# Patient Record
Sex: FEMALE | Race: WHITE | NOT HISPANIC OR LATINO | Employment: FULL TIME | URBAN - METROPOLITAN AREA
[De-identification: names, ages, dates, MRNs, and addresses within clinical notes are randomized per-mention and may not be internally consistent; named-entity substitution may affect disease eponyms.]

---

## 2017-01-04 ENCOUNTER — HOSPITAL ENCOUNTER (EMERGENCY)
Facility: HOSPITAL | Age: 37
Discharge: HOME | End: 2017-01-04
Attending: EMERGENCY MEDICINE
Payer: MEDICAID

## 2017-01-04 VITALS
HEART RATE: 56 BPM | TEMPERATURE: 98 F | HEIGHT: 64 IN | DIASTOLIC BLOOD PRESSURE: 62 MMHG | SYSTOLIC BLOOD PRESSURE: 107 MMHG | RESPIRATION RATE: 16 BRPM | WEIGHT: 140 LBS | OXYGEN SATURATION: 99 % | BODY MASS INDEX: 23.9 KG/M2

## 2017-01-04 DIAGNOSIS — M25.512 ACUTE PAIN OF LEFT SHOULDER: ICD-10-CM

## 2017-01-04 DIAGNOSIS — M79.622 LEFT UPPER ARM PAIN: ICD-10-CM

## 2017-01-04 DIAGNOSIS — S49.92XA SHOULDER INJURY, LEFT, INITIAL ENCOUNTER: Primary | ICD-10-CM

## 2017-01-04 DIAGNOSIS — L03.012 PARONYCHIA OF FINGER, LEFT: ICD-10-CM

## 2017-01-04 PROCEDURE — 96372 THER/PROPH/DIAG INJ SC/IM: CPT

## 2017-01-04 PROCEDURE — 63600175 PHARM REV CODE 636 W HCPCS: Performed by: NURSE PRACTITIONER

## 2017-01-04 PROCEDURE — 64450 NJX AA&/STRD OTHER PN/BRANCH: CPT

## 2017-01-04 PROCEDURE — 99283 EMERGENCY DEPT VISIT LOW MDM: CPT | Mod: 25

## 2017-01-04 PROCEDURE — 25000003 PHARM REV CODE 250: Performed by: NURSE PRACTITIONER

## 2017-01-04 RX ORDER — METHOCARBAMOL 500 MG/1
1000 TABLET, FILM COATED ORAL 3 TIMES DAILY
Qty: 30 TABLET | Refills: 0 | Status: SHIPPED | OUTPATIENT
Start: 2017-01-04 | End: 2017-01-09

## 2017-01-04 RX ORDER — SULFAMETHOXAZOLE AND TRIMETHOPRIM 800; 160 MG/1; MG/1
1 TABLET ORAL 2 TIMES DAILY
Qty: 14 TABLET | Refills: 0 | Status: SHIPPED | OUTPATIENT
Start: 2017-01-04 | End: 2017-01-11

## 2017-01-04 RX ORDER — HYDROMORPHONE HYDROCHLORIDE 2 MG/ML
0.5 INJECTION, SOLUTION INTRAMUSCULAR; INTRAVENOUS; SUBCUTANEOUS
Status: COMPLETED | OUTPATIENT
Start: 2017-01-04 | End: 2017-01-04

## 2017-01-04 RX ORDER — LIDOCAINE HYDROCHLORIDE 10 MG/ML
10 INJECTION INFILTRATION; PERINEURAL
Status: COMPLETED | OUTPATIENT
Start: 2017-01-04 | End: 2017-01-04

## 2017-01-04 RX ORDER — ACETAMINOPHEN 325 MG/1
325 TABLET ORAL EVERY 6 HOURS PRN
COMMUNITY

## 2017-01-04 RX ORDER — NAPROXEN 500 MG/1
500 TABLET ORAL 2 TIMES DAILY WITH MEALS
Qty: 10 TABLET | Refills: 0 | Status: SHIPPED | OUTPATIENT
Start: 2017-01-04 | End: 2017-01-09

## 2017-01-04 RX ADMIN — LIDOCAINE HYDROCHLORIDE 10 ML: 10 INJECTION, SOLUTION INFILTRATION; PERINEURAL at 12:01

## 2017-01-04 RX ADMIN — HYDROMORPHONE HYDROCHLORIDE 0.5 MG: 2 INJECTION, SOLUTION INTRAMUSCULAR; INTRAVENOUS; SUBCUTANEOUS at 12:01

## 2017-01-04 NOTE — ED PROVIDER NOTES
Encounter Date: 2017    SCRIBE #1 NOTE: I, CarrolVaughnFátima Booth, am scribing for, and in the presence of,  Addi Mendes NP. I have scribed the following portions of the note - Other sections scribed: HPI, ROS.       History     Chief Complaint   Patient presents with    Shoulder Pain     left shoulder since yesterday after playing with her daughter who pulled hard on her left arm     Review of patient's allergies indicates:  No Known Allergies  HPI Comments: CC: Shoulder Pain    37 y/o female with anxiety, bulging disc, depression, and chronic cystitis presents to the ED c/o acute onset L sided shoulder pain that radiates to back side of upper L arm. Pain is severe (10/10), exacerbates with movement, and have progressively worsened. Pt reports that she was playing with her daughter yesterday, who accidentally pulled pt's L arm outwardly. Pt attempted Tylenol and ibuprofen with no relief. Pt reports that she has been under a lot of stress recently. Pt denies fever, cough, weakness/numbness to fingers, or elbow pain. No alleviating factors or other symptoms reported.    The history is provided by the patient. No  was used.     Past Medical History   Diagnosis Date    Anxiety     Bulging disc     Chronic cystitis     Depression      No past medical history pertinent negatives.  Past Surgical History   Procedure Laterality Date    Hysterectomy       section, classic       x2    Tonsillectomy      Sinus surgery       Family History   Problem Relation Age of Onset    Hypertension Mother      Social History   Substance Use Topics    Smoking status: Never Smoker    Smokeless tobacco: Never Used    Alcohol use No     Review of Systems   Constitutional: Negative for chills and fever.   HENT: Negative for rhinorrhea.    Eyes: Negative for redness.   Respiratory: Negative for cough and shortness of breath.    Cardiovascular: Negative for chest pain.   Gastrointestinal: Negative for  abdominal pain, diarrhea, nausea and vomiting.   Genitourinary: Negative for difficulty urinating and dysuria.   Musculoskeletal:        (+) L sided shoulder pain that radiates to back side of upper L arm   (-) numbness/weakness to fingers  (-) elbow pain   Skin: Negative for rash.   Neurological: Negative for headaches.       Physical Exam   Initial Vitals   BP Pulse Resp Temp SpO2   01/04/17 1108 01/04/17 1108 01/04/17 1108 01/04/17 1108 01/04/17 1108   125/85 111 20 98 °F (36.7 °C) 99 %     Physical Exam    Nursing note and vitals reviewed.  Constitutional: She appears well-developed and well-nourished. She is not diaphoretic. She is cooperative.  Non-toxic appearance. No distress.   HENT:   Head: Normocephalic and atraumatic.   Right Ear: External ear normal.   Left Ear: External ear normal.   Eyes: Conjunctivae and EOM are normal.   Neck: Normal range of motion. No tracheal deviation and normal range of motion present. No rigidity.   Cardiovascular: Normal rate, regular rhythm, normal heart sounds and intact distal pulses. Exam reveals no gallop and no friction rub.    No murmur heard.  Pulses:       Radial pulses are 2+ on the right side, and 2+ on the left side.   Pulmonary/Chest: Effort normal and breath sounds normal. No stridor. No tachypnea and no bradypnea. No respiratory distress. She has no wheezes. She has no rhonchi. She has no rales. She exhibits no tenderness.   Abdominal: Soft. Bowel sounds are normal. She exhibits no distension and no mass. There is no tenderness.   Musculoskeletal:        Left shoulder: She exhibits decreased range of motion, tenderness and pain. She exhibits no swelling, no effusion, no deformity, no laceration and no spasm.        Left elbow: Normal. No tenderness found.        Left upper arm: She exhibits tenderness.        Arms:       Left hand: She exhibits normal capillary refill. Decreased sensation is not present in the ulnar distribution, is not present in the medial  redistribution and is not present in the radial distribution. Motor /Testing: The patient has normal left wrist strength. Wrist Extension: 5/5. Wrist Flexion: 5/5. : 5/5. Index Finger: 5/5. Middle Finger: 5/5. Ring Finger: 5/5. Little Finger: 5/5. Thumb APB: 5/5. Thumb FPL: 5/5. Pinch Mechanism: 5/5.        Hands:  Mild tender to palpation over the posterior left shoulder.  No starting erythema, edema, or bruising noted.  No crepitus with range of motion.  Decreased range of motion secondary to pain however equal strength bilaterally.   Neurological: She is alert and oriented to person, place, and time. She has normal strength. No sensory deficit. GCS eye subscore is 4. GCS verbal subscore is 5. GCS motor subscore is 6.   Skin: Skin is warm, dry and intact. No rash noted. No cyanosis or erythema. Nails show no clubbing.   Psychiatric: Her behavior is normal. Thought content normal. Her mood appears anxious.         ED Course   I & D - Incision and Drainage  Date/Time: 1/4/2017 1:30 PM  Performed by: MEL BUNCH  Authorized by: ONEAL BERNARDO JR   Type: abscess  Body area: upper extremity  Location details: left index finger  Anesthesia: digital block    Anesthesia:  Anesthesia: digital block  Local Anesthetic: lidocaine 1% without epinephrine   Anesthetic total: 1 mL  Patient sedated: no  Scalpel size: 11  Complications: No  Specimens: No  Implants: No  Comments: Attempted to I&D the wound.  After partial attempt at digital block the patient refused further injections he finished the block or attempts at I&D.        Labs Reviewed - No data to display       X-Rays:   Independently Interpreted Readings:   Other Readings:  X-ray of the left shoulder reviewed by me reveals no evidence of fracture or dislocation.    Medical Decision Making:   Clinical Tests:   Radiological Study: Ordered and Reviewed       APC / Resident Notes:   This is an evaluation of a 36-year-old female that presents emergency  department complaints of left shoulder and upper arm pain since last night.  She also reports redness and swelling to the radial lateral nail fold of the second digit. The patient is a non-toxic, afebrile, anxious, but well appearing female. On physical exam, there is tender to palpation of the posterior left shoulder and the proximal upper left muscular humerus.  No palpable deformity or crepitus noted on exam.  No surrounding erythema, edema, or bruising.  She does have full range of motion with the left shoulder however she does report pain with movement of the shoulder.  Unable to perform testing behind back secondary to pain area.  Radial pulse +2 and equal bilaterally equal sensation.  No bony tenderness over the distal humerus or the elbow.  She also asked me to look at an area of redness along the nail on her left finger.  She reports she was seen at an outside ER and given antibiotics for the infection on her finger however she did not fill them due to cost.  She reports it is not improving or worsening at this point.  There is erythema with minimal fluctuance to the ulnar lateral nail fold of the second digit.  It is no tenderness over the DIP or PIP joints.  No surrounding erythema past lateral nail fold.  No area of increased warmth.  No wound drainage at this time.  Cap refill normal. Vital Signs Are Reassuring. RESULTS: X-ray negative for fracture dislocation of the shoulder.  Procedures: Attempted to I&D the wound to the finger however after partially numbing the digit the patient reported she does not want to go forward with the I&D.  Will cover with oral antibiotics and wound care instructions.    Given the above findings, my overall impression is shoulder pain, shoulder injury, paronychia. Given the above findings, I do not think the patient has fracture, dislocation, septic joint, significant cellulitis requiring inpatient admission.    In the ED the patient was treated with: IM Dilaudid.  After  the medication, the patient reports her shoulder pain is improved, she is ranging it more, and appears less anxious.  The patient will be discharged home with Robaxin, naproxen, and Bactrim. Additional recommendations: Wound care instructions. The diagnosis, treatment plan, instructions for follow-up and reevaluation with her PCP for finger and orthopedics for shoulder as well as ED return precautions have been discussed with the patient and she has verbalized an understanding of the information.  The patient and significant other were informed that there were no bony abnormalities.  This does not completely exclude ligament injuries of the shoulder and instructions for follow-up.  All questions or concerns from the patient have been addressed. This case was discussed with Dr. Carr who is in agreement with my assessment and plan.  MAG Gutierrez, COLT-KONSTANTIN       Scribe Attestation:   Scribe #1: I performed the above scribed service and the documentation accurately describes the services I performed. I attest to the accuracy of the note.    Attending Attestation:     Physician Attestation Statement for NP/PA:   I discussed this assessment and plan of this patient with the NP/PA, but I did not personally examine the patient. The face to face encounter was performed by the NP/PA.    Other NP/PA Attestation Additions:    History of Present Illness: 36-year-old female presents the emergency department with left shoulder injury.   Physical Exam: Complete physical examination performed in detail by midlevel provider and discussed with me.   Medical Decision Making: This is the emergent evaluation of a 36-year-old female presents the emergency department with left shoulder pain after injury.I agree with the treatment plan and evaluation as outlined by the midlevel provider.  X-ray clear fracture or dislocation.  We'll advised him to medical treatment.  Advise follow-up with PCP as needed and return for new or worsening  symptoms.  Likely muscle strain.  Cannot completely rule out tenderness or ligamentous injury.  Patient advised to return for new or worsening symptoms such as weakness of the upper extremity or persistent paresthesias.       Physician Attestation for Scribe:  Physician Attestation Statement for Scribe #1: I, Addi Friedman - NP, reviewed documentation, as scribed by Jerrica Booth in my presence, and it is both accurate and complete.                 ED Course     Clinical Impression:   The primary encounter diagnosis was Shoulder injury, left, initial encounter. Diagnoses of Acute pain of left shoulder, Left upper arm pain, and Paronychia of finger, left were also pertinent to this visit.    Disposition:   Disposition: Discharged  Condition: Stable       COLT Chaudhry  01/04/17 134       Sammy Ortiz Jr., MD  01/04/17 5298

## 2017-01-04 NOTE — ED AVS SNAPSHOT
[facility]  [address] [place] [address]               Serene Loaiza   2017 11:31 AM   ED    Description:  Female : 1980   Department:  [facility]           Your Care was Coordinated By:     Provider Role From To    Sammy Ortiz Jr., MD Attending Provider 17 1133 --    COLT Chaudhry Nurse Practitioner 17 1130 --      Reason for Visit     Shoulder Pain           Diagnoses this Visit        Comments    Shoulder injury, left, initial encounter    -  Primary     Acute pain of left shoulder         Left upper arm pain         Paronychia of finger, left           ED Disposition     ED Disposition Condition Comment    Discharge             To Do List           Follow-up Information     Follow up with Vicky Mcneill NP.    Specialty:  Family Medicine    Why:  This Week, For Follow-Up of your finger    Contact information:    [address]  [place] [address]  [phone]          Schedule an appointment as soon as possible for a visit with Jd Grewal MD.    Specialty:  Orthopedic Surgery    Why:  For Follow-Up of your shoulder    Contact information:    [address] [place] [address]  [phone]          Go to [facility].    Specialty:  Emergency Medicine    Why:  If symptoms worsen    Contact information:    [address] [place] [address] [phone]       These Medications        Disp Refills Start End    naproxen (NAPROSYN) 500 MG tablet 10 tablet 0 2017    Take 1 tablet (500 mg total) by mouth 2 (two) times daily with meals. - Oral    Pharmacy: [facility]/pharmacy #[id number] [place] [address] [place] - [address] Ph #: [phone]       sulfamethoxazole-trimethoprim 800-160mg (BACTRIM DS) 800-160 mg Tab 14 tablet 0 2017    Take 1 tablet by mouth 2 (two) times daily. - Oral    Pharmacy: [facility]/pharmacy #[id number]  [place] - [address]  Ph #: [phone]       methocarbamol (ROBAXIN) 500 MG Tab 30 tablet 0 1/4/2017 1/9/2017    Take 2 tablets (1,000 mg total) by mouth 3 (three) times daily. - Oral    Pharmacy: [facility]/pharmacy #[id number] - [place] [address] Ph #: [phone]         [facility]     [facility] On [facility] Care Line - 24/7 Assistance  Registered nurses in the [facility] provide clinical advisement, health education, appointment booking, and other advisory services.  Call for this free service at [phone].             Medications           Message regarding Medications     Verify the changes and/or additions to your medication regime listed below are the same as discussed with your clinician today.  If any of these changes or additions are incorrect, please notify your healthcare provider.        START taking these NEW medications        Refills    naproxen (NAPROSYN) 500 MG tablet 0    Sig: Take 1 tablet (500 mg total) by mouth 2 (two) times daily with meals.    Class: Print    Route: Oral    sulfamethoxazole-trimethoprim 800-160mg (BACTRIM DS) 800-160 mg Tab 0    Sig: Take 1 tablet by mouth 2 (two) times daily.    Class: Print    Route: Oral    methocarbamol (ROBAXIN) 500 MG Tab 0    Sig: Take 2 tablets (1,000 mg total) by mouth 3 (three) times daily.    Class: Print    Route: Oral      These medications were administered today        Dose Freq    lidocaine HCL 10 mg/ml (1%) injection 10 mL 10 mL ED 1 Time    Sig: 10 mLs by Infiltration route ED 1 Time.    Class: Normal    Route: Infiltration    hydromorphone (PF) injection 0.5 mg 0.5 mg ED 1 Time    Sig: Inject 0.25 mLs (0.5 mg total) into the muscle ED 1 Time.    Class: Normal    Route: Intramuscular      STOP taking these medications     docusate sodium (COLACE) 50 MG capsule Take by mouth 2 (two) times daily.    tramadol (ULTRAM) 50 mg tablet Take 1 tablet (50 mg total) by mouth every 6 (six) hours as needed for Pain.    promethazine (PHENERGAN) 25 MG tablet  "Take 1 tablet (25 mg total) by mouth every 4 (four) hours.    gabapentin (NEURONTIN) 300 MG capsule TAKE ONE CAPSULE EVERY EVENING    cyclobenzaprine (FLEXERIL) 5 MG tablet Take 1 tablet (5 mg total) by mouth 3 (three) times daily as needed for Muscle spasms.    alprazolam (XANAX) 0.25 MG tablet TAKE 1 TABLET BY MOUTH TWICE DAILY AS NEEDED           Verify that the below list of medications is an accurate representation of the medications you are currently taking.  If none reported, the list may be blank. If incorrect, please contact your healthcare provider. Carry this list with you in case of emergency.           Current Medications     acetaminophen (TYLENOL) 325 MG tablet Take 325 mg by mouth every 6 (six) hours as needed for Pain.    methocarbamol (ROBAXIN) 500 MG Tab Take 2 tablets (1,000 mg total) by mouth 3 (three) times daily.    naproxen (NAPROSYN) 500 MG tablet Take 1 tablet (500 mg total) by mouth 2 (two) times daily with meals.    sulfamethoxazole-trimethoprim 800-160mg (BACTRIM DS) 800-160 mg Tab Take 1 tablet by mouth 2 (two) times daily.           Clinical Reference Information           Your Vitals Were     BP Pulse Temp Resp Height Weight    125/85 (BP Location: Right arm, Patient Position: Sitting) 111 98 °F (36.7 °C) (Oral) 20 5' 4" (1.626 m) 63.5 kg (140 lb)    Last Period SpO2 BMI          09/09/2013 99% 24.03 kg/m2        Allergies as of 1/4/2017     No Known Allergies      Immunizations Administered on Date of Encounter - 1/4/2017     None      ED Micro, Lab, POCT     None      ED Imaging Orders     Start Ordered       Status Ordering Provider    01/04/17 1114 01/04/17 1113  X-Ray Shoulder 2 or More Views Left  1 time imaging      Final result         Discharge Instructions       Please return to the Emergency Department for any new or worsening symptoms including: worsening shoulder pain, changes in your current pain, fever, chest pain, shortness of breath, loss of consciousness, dizziness, " weakness, or any other concerns. Please follow up with your Primary Care Provider within in the week for reevaluation of your left finger.  Please follow up with orthopedics for further evaluation of your shoulder pain.  Please do not use the sling more than 1-2 days to help with pain.    Please take all medication as prescribed.       Discharge References/Attachments     PARONYCHIA (ENGLISH)    R.I.C.E. (ENGLISH)    SHOULDER PAIN, UNCERTAIN CAUSE (ENGLISH)       [facility] complies with applicable Federal civil rights laws and does not discriminate on the basis of race, color, national origin, age, disability, or sex.        Language Assistance Services     ATTENTION: Language assistance services are available, free of charge. Please call [phone].      ATENCIÓN: Si nicolasala angelita, tiene a ovalle disposición servicios gratuitos de asistencia lingüística. Llame al [phone].     CHÚ Ý: N?u b?n nói Ti?ng Vi?t, có các d?ch v? h? tr? ngôn ng? mi?n phí dành cho b?n. G?i s? [phone].

## 2017-01-04 NOTE — DISCHARGE INSTRUCTIONS
Please return to the Emergency Department for any new or worsening symptoms including: worsening shoulder pain, changes in your current pain, fever, chest pain, shortness of breath, loss of consciousness, dizziness, weakness, or any other concerns. Please follow up with your Primary Care Provider within in the week for reevaluation of your left finger.  Please follow up with orthopedics for further evaluation of your shoulder pain.  Please do not use the sling more than 1-2 days to help with pain.    Please take all medication as prescribed.

## 2020-01-10 ENCOUNTER — TELEPHONE (OUTPATIENT)
Dept: TRANSPLANT | Facility: CLINIC | Age: 40
End: 2020-01-10

## 2020-01-10 NOTE — TELEPHONE ENCOUNTER
----- Message from Fanta Hylton sent at 1/10/2020  3:40 PM CST -----  Contact: Self    Returned call to pt x2. Called pt 3rd time, but she said that she will call back;she was at work    ----- Message -----  From: Olivia Ruff MA  Sent: 1/10/2020   3:37 PM CST  To: [facility]    This is not an EP. They need to go to the referral team first  ----- Message -----  From: Jesica Salinas  Sent: 1/6/2020   2:23 PM CST  To: [facility] Hepat Clinical Staff    Pt ask for a call in regards to needing to schedule an appointment    Contact info  [phone]

## 2020-01-14 ENCOUNTER — TELEPHONE (OUTPATIENT)
Dept: TRANSPLANT | Facility: CLINIC | Age: 40
End: 2020-01-14

## 2020-01-14 NOTE — TELEPHONE ENCOUNTER
----- Message from Fanta Hylton sent at 1/14/2020 10:26 AM CST -----    Called Maggie Gaston  office at [phone], but she is at the [place] location. Called [phone] and told them that the pt would like to come for a second opinion;pt has a hepatis plane done and when was sent to [facility], but was told that she didn't  need to be seen. They will fax pt labs to [phone]

## 2020-01-21 ENCOUNTER — DOCUMENTATION ONLY (OUTPATIENT)
Dept: TRANSPLANT | Facility: CLINIC | Age: 40
End: 2020-01-21

## 2020-01-21 NOTE — PROGRESS NOTES
Pt records reviewed.  Pt will be referred to Hepatology due to SEEKING SECOND OPINION ON HEP B LAB S  Initial referral received  from  SELF   Referral letter sent to provider and patient.

## 2020-01-30 ENCOUNTER — TELEPHONE (OUTPATIENT)
Dept: HEPATOLOGY | Facility: CLINIC | Age: 40
End: 2020-01-30

## 2020-01-30 NOTE — TELEPHONE ENCOUNTER
MA called patient to inform her of the change of time on 3/30 she understood and verbalized understanding.

## 2020-02-10 ENCOUNTER — TELEPHONE (OUTPATIENT)
Dept: HEPATOLOGY | Facility: CLINIC | Age: 40
End: 2020-02-10

## 2020-04-07 ENCOUNTER — TELEPHONE (OUTPATIENT)
Dept: HEPATOLOGY | Facility: CLINIC | Age: 40
End: 2020-04-07

## 2020-04-07 NOTE — TELEPHONE ENCOUNTER
A spoke to the pt. She wanted to move her appointment up and she agreed to video visit. I will send her the video instructions

## 2020-04-09 ENCOUNTER — OFFICE VISIT (OUTPATIENT)
Dept: HEPATOLOGY | Facility: CLINIC | Age: 40
End: 2020-04-09
Payer: MEDICAID

## 2020-04-09 ENCOUNTER — PATIENT MESSAGE (OUTPATIENT)
Dept: HEPATOLOGY | Facility: CLINIC | Age: 40
End: 2020-04-09

## 2020-04-09 ENCOUNTER — TELEPHONE (OUTPATIENT)
Dept: HEPATOLOGY | Facility: CLINIC | Age: 40
End: 2020-04-09

## 2020-04-09 DIAGNOSIS — B19.10 HEPATITIS B INFECTION WITHOUT DELTA AGENT WITHOUT HEPATIC COMA, UNSPECIFIED CHRONICITY: Primary | ICD-10-CM

## 2020-04-09 PROCEDURE — 99203 PR OFFICE/OUTPT VISIT, NEW, LEVL III, 30-44 MIN: ICD-10-PCS | Mod: 95,,, | Performed by: INTERNAL MEDICINE

## 2020-04-09 PROCEDURE — 99203 OFFICE O/P NEW LOW 30 MIN: CPT | Mod: 95,,, | Performed by: INTERNAL MEDICINE

## 2020-04-09 NOTE — PROGRESS NOTES
"Hepatology Consult Note    Referring provider: Aaareferral Self    Chief complaint:   Chief Complaint   Patient presents with    Hepatitis B     The patient location is: home  The chief complaint leading to consultation is: "hepatitis B positive test"  Visit type: Virtual visit with synchronous audio and video  Total time spent with patient: 15 mins  Each patient to whom he or she provides medical services by telemedicine is:  (1) informed of the relationship between the physician and patient and the respective role of any other health care provider with respect to management of the patient; and (2) notified that he or she may decline to receive medical services by telemedicine and may withdraw from such care at any time.    Notes:       HPI:  Serene Loaiza is a pleasant 39 y.o. female who was referred to Hepatology Clinic for consultation of had concerns including Hepatitis B..      20: The consult encounter was performed virtually via Video Visit (Telehealth/Telemedicine) due to Covid-19 pandemic.    External labs  19: Hepatitis B core Ig M positive. Hepatitis B surface Ag: neg                Hepatitis C antibody: neg    Patient has remote hx of drug use and she is concerned. She was told that HBsAg Ig M positivity was false positive.    Patient Active Problem List   Diagnosis    Lumbar disc disease    Lumbar discogenic pain syndrome    Lumbar facet arthropathy       Past Medical History:   Diagnosis Date    Anxiety     Bulging disc     Chronic cystitis     Depression        Past Surgical History:   Procedure Laterality Date     SECTION, CLASSIC      x2    HYSTERECTOMY      SINUS SURGERY      TONSILLECTOMY         Family History   Problem Relation Age of Onset    Hypertension Mother        Social History     Socioeconomic History    Marital status:      Spouse name: Not on file    Number of children: Not on file    Years of education: Not on file    Highest education " level: Not on file   Occupational History     Employer: [employer]   Social Needs    Financial resource strain: Not on file    Food insecurity:     Worry: Not on file     Inability: Not on file    Transportation needs:     Medical: Not on file     Non-medical: Not on file   Tobacco Use    Smoking status: Never Smoker    Smokeless tobacco: Never Used   Substance and Sexual Activity    Alcohol use: No    Drug use: Yes     Types: IV, Methamphetamines     Comment: last use 8/18/17    Sexual activity: Yes     Partners: Male     Birth control/protection: See Surgical Hx   Lifestyle    Physical activity:     Days per week: Not on file     Minutes per session: Not on file    Stress: Not on file   Relationships    Social connections:     Talks on phone: Not on file     Gets together: Not on file     Attends Hinduism service: Not on file     Active member of club or organization: Not on file     Attends meetings of clubs or organizations: Not on file     Relationship status: Not on file   Other Topics Concern    Not on file   Social History Narrative    Not on file       Current Outpatient Medications   Medication Sig Dispense Refill    acetaminophen (TYLENOL) 325 MG tablet Take 325 mg by mouth every 6 (six) hours as needed for Pain.       No current facility-administered medications for this visit.        Review of patient's allergies indicates:  No Known Allergies         There were no vitals filed for this visit.    Physical Exam    LABS: I personally reviewed pertinent laboratory findings.    Lab Results   Component Value Date    ALT 12 04/25/2007    AST 22 04/25/2007    ALKPHOS 96 04/25/2007    BILITOT 0.3 04/25/2007       Lab Results   Component Value Date    WBC 7.35 04/15/2009    HGB 12.9 04/15/2009    HCT 39.9 04/15/2009    MCV 91.5 04/15/2009     04/15/2009       Lab Results   Component Value Date     04/25/2007    K 3.6 04/25/2007     04/25/2007    CO2 22 (L)  04/25/2007    BUN 10 04/25/2007    CREATININE 0.9 04/25/2007    CALCIUM 9.6 04/25/2007       No results found for: HAV, HEPAIGM, HEPBIGM, HEPBCAB, HBEAG, HEPCAB    No results found for: SMOOTHMUSCAB, MITOAB    I personally reviewed all recent lab results.  I personally reviewed imaging studies.    Assessment:  39 y.o. female presenting with     1. Hepatitis B infection without delta agent without hepatic coma, unspecified chronicity      HBs core Ig M positive in August 2019 - external lab, patient was told that it was false positive. Not sure confirmation test was done.  Will repeat HBV status serologies to ensure she does not have a chronic hep B infection.      Recommendation(s):  - labs: Hepatitis B surface antigen, Hepatitis B DNA quant, HBs Ab  - hepatitis panel  - counseled for HBV    A total of 15 minutes were spent face-to-face with the patient during this encounter and over half of that time was spent on counseling and coordination of care.  We discussed in depth the nature of the hepatitis B, the management plan in details. I have provided the patient with an opportunity to ask questions and have all questions answered to his satisfaction.     I have sent communication to the referring physician and/or primary care provider.    Yokasta Minor MD  Staff Physician  Hepatology and Liver Transplant  [facility]  [facility] [place]

## 2020-04-09 NOTE — Clinical Note
Please schedule labs (she wants to come to [facility]) in the first week of May after the lockdown. No need for routine follow up

## 2020-04-09 NOTE — TELEPHONE ENCOUNTER
----- Message from Yokasta Minor MD sent at 4/9/2020  2:10 PM CDT -----  Please schedule labs (she wants to come to [facility]) in the first week of May after the lockdown. No need for routine follow up

## 2020-05-08 ENCOUNTER — LAB VISIT (OUTPATIENT)
Dept: LAB | Facility: HOSPITAL | Age: 40
End: 2020-05-08
Attending: INTERNAL MEDICINE
Payer: MEDICAID

## 2020-05-08 DIAGNOSIS — B19.10 HEPATITIS B INFECTION WITHOUT DELTA AGENT WITHOUT HEPATIC COMA, UNSPECIFIED CHRONICITY: ICD-10-CM

## 2020-05-08 LAB
HBV CORE AB SERPL QL IA: POSITIVE
HBV SURFACE AB SER-ACNC: POSITIVE M[IU]/ML
HBV SURFACE AG SERPL QL IA: NEGATIVE

## 2020-05-08 PROCEDURE — 86706 HEP B SURFACE ANTIBODY: CPT

## 2020-05-08 PROCEDURE — 36415 COLL VENOUS BLD VENIPUNCTURE: CPT

## 2020-05-08 PROCEDURE — 87517 HEPATITIS B DNA QUANT: CPT

## 2020-05-08 PROCEDURE — 86704 HEP B CORE ANTIBODY TOTAL: CPT

## 2020-05-08 PROCEDURE — 87340 HEPATITIS B SURFACE AG IA: CPT

## 2020-05-13 LAB
HBV DNA SERPL NAA+PROBE-ACNC: <10 IU/ML
HBV DNA SERPL NAA+PROBE-LOG IU: <1 LOG (10) IU/ML
HBV DNA SERPL QL NAA+PROBE: NOT DETECTED

## 2020-10-16 PROBLEM — R32 INCONTINENCE: Status: ACTIVE | Noted: 2020-10-16

## 2020-10-16 PROBLEM — R10.2 PELVIC PAIN: Status: ACTIVE | Noted: 2020-10-16

## 2020-10-16 PROBLEM — R53.1 WEAKNESS: Status: ACTIVE | Noted: 2020-10-16

## 2021-05-04 ENCOUNTER — PATIENT MESSAGE (OUTPATIENT)
Dept: RESEARCH | Facility: HOSPITAL | Age: 41
End: 2021-05-04

## 2021-05-10 ENCOUNTER — PATIENT MESSAGE (OUTPATIENT)
Dept: RESEARCH | Facility: HOSPITAL | Age: 41
End: 2021-05-10

## 2021-11-27 ENCOUNTER — HOSPITAL ENCOUNTER (EMERGENCY)
Facility: HOSPITAL | Age: 41
Discharge: HOME | End: 2021-11-27
Attending: SURGERY
Payer: MEDICAID

## 2021-11-27 VITALS
WEIGHT: 169.75 LBS | OXYGEN SATURATION: 99 % | HEART RATE: 108 BPM | TEMPERATURE: 98 F | BODY MASS INDEX: 28.69 KG/M2 | DIASTOLIC BLOOD PRESSURE: 78 MMHG | SYSTOLIC BLOOD PRESSURE: 133 MMHG | RESPIRATION RATE: 18 BRPM
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
## 2021-11-27 DIAGNOSIS — M79.602 LEFT ARM PAIN: ICD-10-CM

## 2021-11-27 DIAGNOSIS — S52.125A CLOSED NONDISPLACED FRACTURE OF HEAD OF LEFT RADIUS, INITIAL ENCOUNTER: Primary | ICD-10-CM

## 2021-11-27 PROCEDURE — 29105 APPLICATION LONG ARM SPLINT: CPT | Mod: LT

## 2021-11-27 PROCEDURE — 99284 EMERGENCY DEPT VISIT MOD MDM: CPT | Mod: 25

## 2021-11-27 PROCEDURE — 63600175 PHARM REV CODE 636 W HCPCS: Performed by: SURGERY

## 2021-11-27 PROCEDURE — 96372 THER/PROPH/DIAG INJ SC/IM: CPT | Mod: 59

## 2021-11-27 RX ORDER — ONDANSETRON 2 MG/ML
4 INJECTION INTRAMUSCULAR; INTRAVENOUS
Status: COMPLETED | OUTPATIENT
Start: 2021-11-27 | End: 2021-11-27

## 2021-11-27 RX ORDER — ONDANSETRON 4 MG/1
4 TABLET, ORALLY DISINTEGRATING ORAL EVERY 8 HOURS PRN
Qty: 20 TABLET | Refills: 0 | Status: SHIPPED | OUTPATIENT
Start: 2021-11-27 | End: 2021-11-27 | Stop reason: SDUPTHER

## 2021-11-27 RX ORDER — ONDANSETRON 4 MG/1
4 TABLET, ORALLY DISINTEGRATING ORAL EVERY 8 HOURS PRN
Qty: 20 TABLET | Refills: 0 | Status: SHIPPED | OUTPATIENT
Start: 2021-11-27

## 2021-11-27 RX ORDER — HYDROCODONE BITARTRATE AND ACETAMINOPHEN 5; 325 MG/1; MG/1
1 TABLET ORAL EVERY 4 HOURS PRN
Qty: 20 TABLET | Refills: 0 | Status: SHIPPED | OUTPATIENT
Start: 2021-11-27 | End: 2021-11-27 | Stop reason: SDUPTHER

## 2021-11-27 RX ORDER — HYDROMORPHONE HYDROCHLORIDE 1 MG/ML
1 INJECTION, SOLUTION INTRAMUSCULAR; INTRAVENOUS; SUBCUTANEOUS
Status: COMPLETED | OUTPATIENT
Start: 2021-11-27 | End: 2021-11-27

## 2021-11-27 RX ORDER — HYDROCODONE BITARTRATE AND ACETAMINOPHEN 5; 325 MG/1; MG/1
1 TABLET ORAL EVERY 4 HOURS PRN
Qty: 20 TABLET | Refills: 0 | Status: SHIPPED | OUTPATIENT
Start: 2021-11-27 | End: 2021-11-29

## 2021-11-27 RX ORDER — MORPHINE SULFATE 2 MG/ML
2 INJECTION, SOLUTION INTRAMUSCULAR; INTRAVENOUS
Status: COMPLETED | OUTPATIENT
Start: 2021-11-27 | End: 2021-11-27

## 2021-11-27 RX ADMIN — ONDANSETRON HYDROCHLORIDE 4 MG: 2 SOLUTION INTRAMUSCULAR; INTRAVENOUS at 04:11

## 2021-11-27 RX ADMIN — MORPHINE SULFATE 2 MG: 2 INJECTION, SOLUTION INTRAMUSCULAR; INTRAVENOUS at 04:11

## 2021-11-27 RX ADMIN — HYDROMORPHONE HYDROCHLORIDE 1 MG: 1 INJECTION, SOLUTION INTRAMUSCULAR; INTRAVENOUS; SUBCUTANEOUS at 05:11

## 2021-11-27 RX ADMIN — ONDANSETRON HYDROCHLORIDE 4 MG: 2 SOLUTION INTRAMUSCULAR; INTRAVENOUS at 05:11

## 2024-08-04 ENCOUNTER — HOSPITAL ENCOUNTER (EMERGENCY)
Facility: HOSPITAL | Age: 44
Discharge: HOME | End: 2024-08-04
Attending: EMERGENCY MEDICINE
Payer: MEDICAID

## 2024-08-04 VITALS
TEMPERATURE: 98 F | HEART RATE: 87 BPM | WEIGHT: 170 LBS | OXYGEN SATURATION: 99 % | DIASTOLIC BLOOD PRESSURE: 84 MMHG | BODY MASS INDEX: 28.73 KG/M2 | RESPIRATION RATE: 17 BRPM | SYSTOLIC BLOOD PRESSURE: 114 MMHG

## 2024-08-04 DIAGNOSIS — M25.531 RIGHT WRIST PAIN: Primary | ICD-10-CM

## 2024-08-04 PROBLEM — G89.29 CHRONIC MIDLINE LOW BACK PAIN WITHOUT SCIATICA: Status: ACTIVE | Noted: 2023-05-23

## 2024-08-04 PROBLEM — G90.50 RSD (REFLEX SYMPATHETIC DYSTROPHY): Status: ACTIVE | Noted: 2022-04-19

## 2024-08-04 PROBLEM — M54.50 CHRONIC MIDLINE LOW BACK PAIN WITHOUT SCIATICA: Status: ACTIVE | Noted: 2023-05-23

## 2024-08-04 PROCEDURE — 63600175 PHARM REV CODE 636 W HCPCS: Mod: ER | Performed by: EMERGENCY MEDICINE

## 2024-08-04 PROCEDURE — 99284 EMERGENCY DEPT VISIT MOD MDM: CPT | Mod: 25,ER

## 2024-08-04 PROCEDURE — 29125 APPL SHORT ARM SPLINT STATIC: CPT | Mod: RT,ER

## 2024-08-04 PROCEDURE — 96372 THER/PROPH/DIAG INJ SC/IM: CPT | Performed by: EMERGENCY MEDICINE

## 2024-08-04 RX ORDER — DICLOFENAC SODIUM 10 MG/G
2 GEL TOPICAL 4 TIMES DAILY PRN
Qty: 200 G | Refills: 0 | Status: SHIPPED | OUTPATIENT
Start: 2024-08-04

## 2024-08-04 RX ORDER — METHOCARBAMOL 500 MG/1
1000 TABLET, FILM COATED ORAL 3 TIMES DAILY
Qty: 30 TABLET | Refills: 0 | Status: SHIPPED | OUTPATIENT
Start: 2024-08-04 | End: 2024-08-09

## 2024-08-04 RX ORDER — KETOROLAC TROMETHAMINE 30 MG/ML
30 INJECTION, SOLUTION INTRAMUSCULAR; INTRAVENOUS
Status: COMPLETED | OUTPATIENT
Start: 2024-08-04 | End: 2024-08-04

## 2024-08-04 RX ORDER — DEXAMETHASONE SODIUM PHOSPHATE 4 MG/ML
8 INJECTION, SOLUTION INTRA-ARTICULAR; INTRALESIONAL; INTRAMUSCULAR; INTRAVENOUS; SOFT TISSUE
Status: COMPLETED | OUTPATIENT
Start: 2024-08-04 | End: 2024-08-04

## 2024-08-04 RX ORDER — ACETAMINOPHEN 500 MG
500 TABLET ORAL EVERY 6 HOURS PRN
Qty: 30 TABLET | Refills: 0 | Status: SHIPPED | OUTPATIENT
Start: 2024-08-04

## 2024-08-04 RX ORDER — IBUPROFEN 600 MG/1
600 TABLET ORAL EVERY 6 HOURS PRN
Qty: 20 TABLET | Refills: 0 | Status: SHIPPED | OUTPATIENT
Start: 2024-08-04

## 2024-08-04 RX ADMIN — DEXAMETHASONE SODIUM PHOSPHATE 8 MG: 4 INJECTION INTRA-ARTICULAR; INTRALESIONAL; INTRAMUSCULAR; INTRAVENOUS; SOFT TISSUE at 03:08

## 2024-08-04 RX ADMIN — KETOROLAC TROMETHAMINE 30 MG: 30 INJECTION, SOLUTION INTRAMUSCULAR at 03:08

## 2024-08-04 NOTE — Clinical Note
"Serene Castillo" Josse was seen and treated in our emergency department on 8/4/2024.  She may return to work on 08/06/2024.       If you have any questions or concerns, please don't hesitate to call.      Sue Delgado, DO"

## 2024-08-04 NOTE — ED PROVIDER NOTES
"Encounter Date: 2024    SCRIBE #1 NOTE: I, JODI Woodward, am scribing for, and in the presence of,  Sue Delgado DO. I have scribed the following portions of the note - Other sections scribed: HPI, ROS, PE, MDM.       History     Chief Complaint   Patient presents with    Wrist Injury     Per pt, this am she heard a pop in her right wrist.   Reports swelling   Has a brace placed on wrist currently      Serene Loaiza is a 43 y.o. female with Hx of chronic arm pain, who presents to the ED for chief complaint of right arm pain which started this morning. Arm pain starts at forearm and radiates down to fingertips. Patient reports she was going to reach for something in the kitchen, when she heard a "tear" in her arm. She then went to the bathroom, where she heard a "pop" come from her arm. She states she has had chronic pain in the arm after spraining her wrist 1 year ago. She did not take any medicine PTA, but wearing a wrist splint helps alleviate pain. No other exacerbating or alleviating factors. Denies any other associated symptoms.    The history is provided by the patient. No  was used.     Review of patient's allergies indicates:   Allergen Reactions    Adhesive Hives    Latex Other (See Comments)     Past Medical History:   Diagnosis Date    Anxiety     Bulging disc     Chronic cystitis     Depression      Past Surgical History:   Procedure Laterality Date     SECTION, CLASSIC      x2    HYSTERECTOMY      SINUS SURGERY      TONSILLECTOMY       Family History   Problem Relation Name Age of Onset    Hypertension Mother       Social History     Tobacco Use    Smoking status: Never    Smokeless tobacco: Never   Substance Use Topics    Alcohol use: No    Drug use: Yes     Types: IV, Methamphetamines     Comment: last use 18     Review of Systems   Constitutional:  Negative for fever.   HENT:  Negative for rhinorrhea and sore throat.    Eyes:  Negative for redness.   Respiratory: "  Negative for shortness of breath.    Cardiovascular:  Negative for chest pain and leg swelling.   Gastrointestinal:  Negative for abdominal pain, diarrhea, nausea and vomiting.   Musculoskeletal:  Positive for arthralgias. Negative for back pain.   Skin:  Negative for rash.   Neurological:  Negative for syncope and headaches.   All other systems reviewed and are negative.      Physical Exam     Initial Vitals [08/04/24 1311]   BP Pulse Resp Temp SpO2   121/82 88 20 97.9 °F (36.6 °C) 98 %      MAP       --         Physical Exam    Nursing note and vitals reviewed.  Constitutional: She appears well-developed and well-nourished.   HENT:   Head: Normocephalic and atraumatic.   Right Ear: External ear normal.   Left Ear: External ear normal.   Nose: Nose normal.   Mouth/Throat: Oropharynx is clear and moist.   Eyes: Conjunctivae and EOM are normal. Pupils are equal, round, and reactive to light.   Neck: Phonation normal. Neck supple.   Normal range of motion.  Cardiovascular:  Normal rate, regular rhythm, normal heart sounds and intact distal pulses.     Exam reveals no gallop and no friction rub.       No murmur heard.  Pulmonary/Chest: Effort normal and breath sounds normal. No stridor. No respiratory distress. She has no wheezes. She has no rhonchi. She has no rales. She exhibits no tenderness.   Abdominal: Abdomen is soft. Bowel sounds are normal. She exhibits no distension. There is no abdominal tenderness. There is no rigidity, no rebound and no guarding.   Musculoskeletal:         General: Tenderness present. No edema. Normal range of motion.      Cervical back: Normal range of motion and neck supple.      Comments: Snuffbox tenderness to right wrist.   No bony deformity, no obvious swelling, no ecchymosis, no bruising. Capillary refill normal.     Neurological: She is alert and oriented to person, place, and time. She has normal strength. No cranial nerve deficit or sensory deficit. GCS score is 15. GCS eye  subscore is 4. GCS verbal subscore is 5. GCS motor subscore is 6.   Skin: Skin is warm and dry. Capillary refill takes less than 2 seconds. No rash noted.   Psychiatric: She has a normal mood and affect. Her behavior is normal.         Patient gave consent to have physical exam performed.    ED Course   Splint Application    Date/Time: 8/5/2024 2:23 PM    Performed by: Sue Delgado DO  Authorized by: Sue Delgado DO  Location details: right wrist  Splint type: thumb spica  Supplies used: prefabricated thumb spica.  Post-procedure: The splinted body part was neurovascularly unchanged following the procedure.  Patient tolerance: Patient tolerated the procedure well with no immediate complications        Labs Reviewed - No data to display       Imaging Results              X-Ray Wrist Complete Right (Final result)  Result time 08/04/24 14:02:43      Final result by Jose Berrios MD (08/04/24 14:02:43)                   Impression:      1. No acute displaced fracture or dislocation of the wrist.      Electronically signed by: Jose Berrios MD  Date:    08/04/2024  Time:    14:02               Narrative:    EXAMINATION:  XR WRIST COMPLETE 3 VIEWS RIGHT    CLINICAL HISTORY:  Pain in right wrist    TECHNIQUE:  PA, lateral, and oblique views of the right wrist were performed.    COMPARISON:  None    FINDINGS:  Three views right wrist.    No acute displaced fracture or dislocation of the wrist.  No radiopaque foreign body.  No significant edema.                                       Medications   ketorolac injection 30 mg (30 mg Intramuscular Given 8/4/24 1502)   dexAMETHasone injection 8 mg (8 mg Intramuscular Given 8/4/24 1501)     Medical Decision Making  Risk  OTC drugs.  Prescription drug management.                                    Medical Decision Making:    This is an evaluation of a 43 y.o. female that presents to the Emergency Department for   Chief Complaint   Patient presents with    Wrist Injury      Per pt, this am she heard a pop in her right wrist.   Reports swelling   Has a brace placed on wrist currently      The patient is a non-toxic and well appearing patient. On physical exam, patient appears well hydrated with moist mucus membranes. Breath sounds are clear and equal bilaterally with no adventitious breath sounds, tachypnea or respiratory distress. Regular rate and rhythm. No murmurs. Abdomen soft and non tender. Patient is tolerating PO without difficulty. Physical exam otherwise as above.     I have reviewed vital signs and nursing notes.   Vital Signs Are Reassuring.     Based on the patient's symptoms, I am considering and evaluating for the following differential diagnoses: strain, sprain, contusion, dislocation, fracture      ED Course:Treatment in the ED included Physical Exam and medications given in ED  Medications   ketorolac injection 30 mg (30 mg Intramuscular Given 8/4/24 1502)   dexAMETHasone injection 8 mg (8 mg Intramuscular Given 8/4/24 1501)   .   Patient reports feeling better after treatment in the ER.   Vital signs reviewed  Nurse's notes reviewed  External Data/Documents Reviewed: Previous medical records and vital signs reviewed, see HPI and Physical exam.     Radiology: ordered as indicated and reviewed.  No acute fracture per radiology report  Recommended patient folow up with her orthopedic specialist in 2-5 days.    Risk  Diagnosis or treatment significantly limited by the following social determinants of health: Body mass index is 28.73 kg/m².     In shared decision making with the patient, we discussed treatment, prescriptions, labs, and imaging results.    Discharge home with   ED Prescriptions       Medication Sig Dispense Start Date End Date Auth. Provider    methocarbamoL (ROBAXIN) 500 MG Tab Take 2 tablets (1,000 mg total) by mouth 3 (three) times daily. for 5 days 30 tablet 8/4/2024 8/9/2024 Sue Delgado DO    acetaminophen (TYLENOL) 500 MG tablet Take 1 tablet (500  mg total) by mouth every 6 (six) hours as needed for Pain (As needed for mild-to-moderate pain). 30 tablet 8/4/2024 -- Sue Delgado, DO    diclofenac sodium (VOLTAREN) 1 % Gel Apply 2 g topically 4 (four) times daily as needed (Apply to painful area 4 times a day as needed for pain). 200 g 8/4/2024 -- Sue Delgado, DO    ibuprofen (ADVIL,MOTRIN) 600 MG tablet Take 1 tablet (600 mg total) by mouth every 6 (six) hours as needed for Pain (Take with food as needed for mild-to-moderate pain). 20 tablet 8/4/2024 -- Sue Delgado,           Fill and take prescriptions as directed.  Return to the ED if symptoms worsen or do not resolve.   Answered questions and discussed discharge plan.    Patient reports resolution of pain with splint placement and is ready for discharge.  Follow up with PCP/specialist in 1 day    The following labs and imaging were reviewed:  No visits with results within 1 Day(s) from this visit.   Latest known visit with results is:   Lab Visit on 05/08/2020   Component Date Value Ref Range Status    Hep B S Ab 05/08/2020 Positive (A)   Final    Hepatitis B Surface Ag 05/08/2020 Negative  Negative Final    Hep B Viral DNA IU/ML 05/08/2020 <10  <10 IU/mL Final    Log HBV IU/mL 05/08/2020 <1.00  <1.00 Log (10) IU/mL Final    Comment: This procedure utilizes a real-time polymerase chain  reaction test from [employer].  The amplification   target is a conserved region in the terminal third of  the hepatitis B surface antigen gene. The lower limit of   quantitation is 10 IU/mL (1.00 Log IU/mL) and the uppper  limit of quantitation is 1 billion IU/mL (9.00 Log IU/mL).  The qualitative limit of detection is 10 IU/mL   (1.00 Log IU/mL). A  Not detected  result does not rule   out infection.  Test performed at [facility],  [address], [place]  [address]     [phone]  Salvador Rodgers MD  - Medical Director      Hepatitis B Virus DNA 05/08/2020 Not detected  Not detected Final     Hep B Core Total Ab 05/08/2020 Positive (A)   Final        Imaging Results              X-Ray Wrist Complete Right (Final result)  Result time 08/04/24 14:02:43      Final result by Jose Berrios MD (08/04/24 14:02:43)                   Impression:      1. No acute displaced fracture or dislocation of the wrist.      Electronically signed by: Jose Berrios MD  Date:    08/04/2024  Time:    14:02               Narrative:    EXAMINATION:  XR WRIST COMPLETE 3 VIEWS RIGHT    CLINICAL HISTORY:  Pain in right wrist    TECHNIQUE:  PA, lateral, and oblique views of the right wrist were performed.    COMPARISON:  None    FINDINGS:  Three views right wrist.    No acute displaced fracture or dislocation of the wrist.  No radiopaque foreign body.  No significant edema.                                     I, Dr. Sue Delgado, personally performed the services described in this documentation. This document was produced by a scribe under my direction and in my presence. All medical record entries made by the scribe were at my direction and in my presence.  I have reviewed the chart and agree that the record reflects my personal performance and is accurate and complete. Sue Delgado DO.     08/05/2024 2:26 PM    Clinical Impression:  Final diagnoses:  [M25.531] Right wrist pain (Primary)          ED Disposition Condition    Discharge Stable          ED Prescriptions       Medication Sig Dispense Start Date End Date Auth. Provider    methocarbamoL (ROBAXIN) 500 MG Tab Take 2 tablets (1,000 mg total) by mouth 3 (three) times daily. for 5 days 30 tablet 8/4/2024 8/9/2024 Sue Delgado DO    acetaminophen (TYLENOL) 500 MG tablet Take 1 tablet (500 mg total) by mouth every 6 (six) hours as needed for Pain (As needed for mild-to-moderate pain). 30 tablet 8/4/2024 -- Sue Delgado DO    diclofenac sodium (VOLTAREN) 1 % Gel Apply 2 g topically 4 (four) times daily as needed (Apply to painful area 4 times a day as needed  for pain). 200 g 8/4/2024 -- Sue Delgado DO    ibuprofen (ADVIL,MOTRIN) 600 MG tablet Take 1 tablet (600 mg total) by mouth every 6 (six) hours as needed for Pain (Take with food as needed for mild-to-moderate pain). 20 tablet 8/4/2024 -- Sue Delgado DO          Follow-up Information       Follow up With Specialties Details Why Contact Info    Juliette Ruiz MD Orthopedic Surgery Schedule an appointment as soon as possible for a visit in 1 day Follow-up further evaluation of your wrist pain [address] [facility] [place] [address]  [phone]      Sebastian Mejia MD Orthopedic Surgery Schedule an appointment as soon as possible for a visit in 1 day Follow-up further evaluation of your wrist pain [id number] [facility]  [address]  [place] [address]  [phone]               Sue Delgado DO  08/05/24 [id number]

## 2024-08-06 ENCOUNTER — OFFICE VISIT (OUTPATIENT)
Dept: ORTHOPEDICS | Facility: CLINIC | Age: 44
End: 2024-08-06
Payer: MEDICAID

## 2024-08-06 DIAGNOSIS — M25.531 RIGHT WRIST PAIN: Primary | ICD-10-CM

## 2024-08-06 DIAGNOSIS — M79.644 PAIN OF RIGHT THUMB: ICD-10-CM

## 2024-08-06 PROCEDURE — 99999 PR PBB SHADOW E&M-EST. PATIENT-LVL III: CPT | Mod: PBBFAC,,,

## 2024-08-06 PROCEDURE — 99204 OFFICE O/P NEW MOD 45 MIN: CPT | Mod: S$PBB,,,

## 2024-08-06 PROCEDURE — 99213 OFFICE O/P EST LOW 20 MIN: CPT | Mod: PBBFAC,PN

## 2024-08-06 PROCEDURE — 1160F RVW MEDS BY RX/DR IN RCRD: CPT | Mod: CPTII,,,

## 2024-08-06 PROCEDURE — 97110 THERAPEUTIC EXERCISES: CPT | Mod: GP,,,

## 2024-08-06 PROCEDURE — 1159F MED LIST DOCD IN RCRD: CPT | Mod: CPTII,,,

## 2024-08-07 DIAGNOSIS — M25.531 RIGHT WRIST PAIN: Primary | ICD-10-CM

## 2024-08-16 ENCOUNTER — TELEPHONE (OUTPATIENT)
Dept: ORTHOPEDICS | Facility: CLINIC | Age: 44
End: 2024-08-16
Payer: MEDICAID

## 2024-08-16 NOTE — TELEPHONE ENCOUNTER
Returned pts' call. Informed pt Ms. Rivera was in surgery today so she did not get a chance to review her MRI results as of now. Informed pt Ms. Rivera will more than likely review her results on Monday and will be giving her a call to discuss the results with her. Informed pt I will send a message to ms rivera to let her know to review pt's MRI results. Pt voiced understanding and had no further questions.      ----- Message from Lucas Gooden sent at 8/16/2024  2:58 PM CDT -----  Contact: pt  Type: Requesting to speak with nurse        Who Called: PT  Regarding: DISCUSS RESULTS FOR MRI  Would the patient rather a call back or a response via [facility]? Call back  Best Call Back Number:  [phone]  Additional Information:

## 2024-08-19 ENCOUNTER — PATIENT MESSAGE (OUTPATIENT)
Dept: ORTHOPEDICS | Facility: CLINIC | Age: 44
End: 2024-08-19
Payer: MEDICAID

## 2024-08-21 ENCOUNTER — OFFICE VISIT (OUTPATIENT)
Dept: ORTHOPEDICS | Facility: CLINIC | Age: 44
End: 2024-08-21
Payer: MEDICAID

## 2024-08-21 DIAGNOSIS — M65.4 DE QUERVAIN'S TENOSYNOVITIS, RIGHT: Primary | ICD-10-CM

## 2024-08-21 PROCEDURE — 1160F RVW MEDS BY RX/DR IN RCRD: CPT | Mod: CPTII,,,

## 2024-08-21 PROCEDURE — 99214 OFFICE O/P EST MOD 30 MIN: CPT | Mod: S$PBB,,,

## 2024-08-21 PROCEDURE — 99999 PR PBB SHADOW E&M-EST. PATIENT-LVL IV: CPT | Mod: PBBFAC,,,

## 2024-08-21 PROCEDURE — 99214 OFFICE O/P EST MOD 30 MIN: CPT | Mod: PBBFAC,PN

## 2024-08-21 PROCEDURE — 1159F MED LIST DOCD IN RCRD: CPT | Mod: CPTII,,,

## 2024-08-21 RX ORDER — CEFAZOLIN SODIUM 2 G/50ML
2 SOLUTION INTRAVENOUS
OUTPATIENT
Start: 2024-08-21

## 2024-08-21 RX ORDER — MUPIROCIN 20 MG/G
OINTMENT TOPICAL
OUTPATIENT
Start: 2024-08-21

## 2024-08-21 RX ORDER — IBUPROFEN 800 MG/1
800 TABLET ORAL 3 TIMES DAILY
Qty: 90 TABLET | Refills: 1 | Status: SHIPPED | OUTPATIENT
Start: 2024-08-21

## 2024-08-21 NOTE — PROGRESS NOTES
Subjective:      Patient ID: Serene Loaiza is a 43 y.o. female.    Chief Complaint: Pain of the Right Wrist      HPI: Serene Loaiza is a 43 y.o. right hand dominant female who presents to clinic for follow up of right wrist/thumb pain. Patient was last seen by myself on 08/06/2024 for this and MRI was ordered. She denies a history of trauma, but states on 08/04/2024 she was reaching for something and felt a tearing sensation in her wrist.  Patient was previously seen and treated with corticosteroid injection x4 by Dr. Jensen Marinelli for DeQuervains.  Patient states her primary concern is the pain and continued popping of the thumb. The pain is aggravated by picking up her grand kids and any use of the right wrist/thumb. Patient has recently been wearing thumb spica brace, taking Ibuprofen 600 mg as needed, and trying to avoid lifting grandkids with some relief.  Denies weakness, numbness, tingling, radiation. The pain is affecting ADLs and limiting desired level of activity.  Patient presents today to review MRI results.    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  History of DVT/PE: Negative      PAST MEDICAL HISTORY:    Past Medical History:   Diagnosis Date    Anxiety     Bulging disc     Chronic cystitis     Depression      MEDICATIONS:   Current Outpatient Medications:     acetaminophen (TYLENOL) 500 MG tablet, Take 1 tablet (500 mg total) by mouth every 6 (six) hours as needed for Pain (As needed for mild-to-moderate pain)., Disp: 30 tablet, Rfl: 0    diclofenac sodium (VOLTAREN) 1 % Gel, Apply 2 g topically 4 (four) times daily as needed (Apply to painful area 4 times a day as needed for pain)., Disp: 200 g, Rfl: 0    ibuprofen (ADVIL,MOTRIN) 600 MG tablet, Take 1 tablet (600 mg total) by mouth every 6 (six) hours as needed for Pain (Take with food as needed for mild-to-moderate pain)., Disp: 20 tablet, Rfl: 0    ondansetron (ZOFRAN-ODT)  4 MG TbDL, Take 1 tablet (4 mg total) by mouth every 8 (eight) hours as needed (nausea)., Disp: 20 tablet, Rfl: 0    ibuprofen (ADVIL,MOTRIN) 800 MG tablet, Take 1 tablet (800 mg total) by mouth 3 (three) times daily., Disp: 90 tablet, Rfl: 1    ALLERGIES:   Review of patient's allergies indicates:   Allergen Reactions    Adhesive Hives    Latex Other (See Comments)       Review of Systems:  Constitution: Negative for chills, fever and night sweats.   HENT: Negative for congestion and headaches.    Eyes: Negative for blurred vision or vision loss.  Cardiovascular: Negative for chest pain and syncope.   Respiratory: Negative for cough and shortness of breath.    Endocrine: Negative for polydipsia, polyphagia and polyuria.   Hematologic/Lymphatic: Negative for bleeding problem. Does not bruise/bleed easily.   Skin: Negative for dry skin, itching and rash.   Musculoskeletal: See HPI.   Gastrointestinal: Negative for abdominal pain and bowel incontinence.   Genitourinary: Negative for bladder incontinence and nocturia.   Neurological: Negative for disturbances in coordination, loss of balance and seizures.   Psychiatric/Behavioral: Negative for depression. The patient does not have insomnia.    Allergic/Immunologic: Negative for hives and persistent infections.          Objective:      There were no vitals filed for this visit.    PHYSICAL EXAM:  General: Alert & oriented x3, well-developed and well-nourished, in no acute distress, sitting comfortably in the exam room.  Skin: Warm and dry. Capillary refill less than 2 seconds.   Head: Normocephalic and atraumatic.   Eyes: Sclera appear normal.   Nose: No deformities seen.   Ears: No deformities seen.   Neck: No tracheal deviation present.   Pulmonary/Chest: Breathing unlabored.   Neurological: Alert and oriented to person, place, and time.   Psychiatric: Mood is pleasant and affect appropriate.       RIGHT HAND/WRIST:        Observation/Inspection:    No evidence of  visible deformity, swelling, discoloration, scars, or atrophy.         Palpation:   Mild tenderness to palpation to the radial wrist.  No tenderness to palpation to the ulnar wrist.   Otherwise, negative tenderness to palpation to bony prominences and soft tissues throughout.        Range of Motion:  Wrist: Full to wrist flexion, extension, radial, and ulnar deviation.   Digits: Full to digit MCP, PIP, and DIP flexion and extension. Pain reported with thumb ROM.        Special Tests:  Finkelstein's Test Positive  WHAT Test  Positive         Strength:    strength not tested today.         Neurovascular Exam:  Digits warm and well perfused, brisk capillary refill <3 seconds throughout  NVI motor/LTS to median, radial, and ulnar nerves, radial pulse 2+      Imaging:   MRI right wrist dated 08/15/2024:   De Quervain tenosynovitis.  Partial tear of the dorsal scapholunate ligament  Partial tear of the articular disc of the TFC      Assessment:       1. De Quervain's tenosynovitis, right        Plan:       Orders Placed This Encounter    ibuprofen (ADVIL,MOTRIN) 800 MG tablet    Case Request Operating Room: RELEASE, HAND, FOR DEQUERVAIN'S TENOSYNOVITIS       I personally discussed this case and reviewed imaging with supervising physician, Dr. Moreno.     I explained the nature of the problem to the patient.     I discussed at length with the patient all the different treatment options available for her right thumb including anti-inflammatories, acetaminophen, rest, ice, heat, physical/occupational therapy, and corticosteroid injections. I explained the potential role of surgery in the treatment of this condition. The patient understands that if non-surgical measures do not adequately control symptoms, surgery will be considered in the future.     Dr. Moreno and I recommend: Due to patient already failing conservative treatment including corticosteroid injection x4, bracing, NSAIDs, activity modification we will now  proceed with surgical intervention. Patient would like to proceed with right DeQuervain release as an outpatient surgery. The risks and benefits of surgery were discussed with the patient today and she verbalized understand. Surgical consents were signed. Orders for surgery were entered. Surgery is scheduled for 10/04/2024.     Medications:  Prescription for Ibuprofen (Motrin) 800 mg TID provided today for PRN pain management. Patient understands to take with food and/or OTC prilosec to decrease GI side effects. Advised patient to refrain from taking other anti-inflammatory medications while taking this medication, however she may alternate with acetaminophen as needed.  Procedures:  None today.   DME:  Continue use of thumb spica brace.  Okay to remove the brace for hygiene, showering, and gentle ROM exercises.  Activity Modification:  Avoid aggravating factors such as lifting grandchildren.  Pain Management: Ice compress to the affected area 2-3x a day for 15-20 minutes as needed for pain management.    Follow-Up: After surgery for POV #1    All of the patient's questions were answered and the patient will contact us if they have any questions or concerns in the interim.    Tia Sagastume PA-C  [facility]  Orthopedic Surgery    Medical Dictation software was used during the dictation of portions or the entirety of this medical record.  Phonetic or grammatic errors may exist due to the use of this software. For clarification, refer to the author of the document.

## 2024-09-11 ENCOUNTER — HOSPITAL ENCOUNTER (EMERGENCY)
Facility: HOSPITAL | Age: 44
Discharge: HOME | End: 2024-09-11
Attending: STUDENT IN AN ORGANIZED HEALTH CARE EDUCATION/TRAINING PROGRAM
Payer: MEDICAID

## 2024-09-11 VITALS
WEIGHT: 172.19 LBS | BODY MASS INDEX: 29.4 KG/M2 | RESPIRATION RATE: 18 BRPM | OXYGEN SATURATION: 100 % | TEMPERATURE: 98 F | HEIGHT: 64 IN | HEART RATE: 95 BPM | DIASTOLIC BLOOD PRESSURE: 69 MMHG | SYSTOLIC BLOOD PRESSURE: 115 MMHG

## 2024-09-11 DIAGNOSIS — N20.0 KIDNEY STONE: Primary | ICD-10-CM

## 2024-09-11 LAB
BILIRUB UR QL STRIP: NEGATIVE
CLARITY UR: CLEAR
COLOR UR: YELLOW
GLUCOSE UR QL STRIP: NEGATIVE
HGB UR QL STRIP: ABNORMAL
KETONES UR QL STRIP: NEGATIVE
LEUKOCYTE ESTERASE UR QL STRIP: NEGATIVE
NITRITE UR QL STRIP: NEGATIVE
PH UR STRIP: 6 [PH] (ref 5–8)
PROT UR QL STRIP: NEGATIVE
SP GR UR STRIP: >=1.03 (ref 1–1.03)
URN SPEC COLLECT METH UR: ABNORMAL
UROBILINOGEN UR STRIP-ACNC: NEGATIVE EU/DL

## 2024-09-11 PROCEDURE — 99285 EMERGENCY DEPT VISIT HI MDM: CPT | Mod: 25

## 2024-09-11 PROCEDURE — 81003 URINALYSIS AUTO W/O SCOPE: CPT | Performed by: STUDENT IN AN ORGANIZED HEALTH CARE EDUCATION/TRAINING PROGRAM

## 2024-09-11 PROCEDURE — 63600175 PHARM REV CODE 636 W HCPCS: Performed by: STUDENT IN AN ORGANIZED HEALTH CARE EDUCATION/TRAINING PROGRAM

## 2024-09-11 PROCEDURE — 96372 THER/PROPH/DIAG INJ SC/IM: CPT | Performed by: STUDENT IN AN ORGANIZED HEALTH CARE EDUCATION/TRAINING PROGRAM

## 2024-09-11 PROCEDURE — 25000003 PHARM REV CODE 250: Performed by: STUDENT IN AN ORGANIZED HEALTH CARE EDUCATION/TRAINING PROGRAM

## 2024-09-11 RX ORDER — PHENAZOPYRIDINE HYDROCHLORIDE 200 MG/1
200 TABLET, FILM COATED ORAL 3 TIMES DAILY
Qty: 6 TABLET | Refills: 0 | Status: SHIPPED | OUTPATIENT
Start: 2024-09-11 | End: 2024-09-12 | Stop reason: ALTCHOICE

## 2024-09-11 RX ORDER — TAMSULOSIN HYDROCHLORIDE 0.4 MG/1
0.4 CAPSULE ORAL
Status: COMPLETED | OUTPATIENT
Start: 2024-09-11 | End: 2024-09-11

## 2024-09-11 RX ORDER — IBUPROFEN 600 MG/1
600 TABLET ORAL EVERY 6 HOURS PRN
Qty: 20 TABLET | Refills: 0 | Status: SHIPPED | OUTPATIENT
Start: 2024-09-11

## 2024-09-11 RX ORDER — TAMSULOSIN HYDROCHLORIDE 0.4 MG/1
0.4 CAPSULE ORAL DAILY
Qty: 10 CAPSULE | Refills: 0 | Status: SHIPPED | OUTPATIENT
Start: 2024-09-11 | End: 2024-09-12 | Stop reason: ALTCHOICE

## 2024-09-11 RX ORDER — OXYCODONE AND ACETAMINOPHEN 5; 325 MG/1; MG/1
1 TABLET ORAL
Status: COMPLETED | OUTPATIENT
Start: 2024-09-11 | End: 2024-09-11

## 2024-09-11 RX ORDER — PHENAZOPYRIDINE HYDROCHLORIDE 100 MG/1
100 TABLET, FILM COATED ORAL
Status: COMPLETED | OUTPATIENT
Start: 2024-09-11 | End: 2024-09-11

## 2024-09-11 RX ORDER — ONDANSETRON 4 MG/1
4 TABLET, ORALLY DISINTEGRATING ORAL
Status: COMPLETED | OUTPATIENT
Start: 2024-09-11 | End: 2024-09-11

## 2024-09-11 RX ORDER — KETOROLAC TROMETHAMINE 30 MG/ML
15 INJECTION, SOLUTION INTRAMUSCULAR; INTRAVENOUS
Status: COMPLETED | OUTPATIENT
Start: 2024-09-11 | End: 2024-09-11

## 2024-09-11 RX ORDER — OXYCODONE AND ACETAMINOPHEN 5; 325 MG/1; MG/1
1 TABLET ORAL EVERY 6 HOURS PRN
Qty: 12 TABLET | Refills: 0 | Status: SHIPPED | OUTPATIENT
Start: 2024-09-11 | End: 2024-09-12 | Stop reason: ALTCHOICE

## 2024-09-11 RX ADMIN — ONDANSETRON 4 MG: 4 TABLET, ORALLY DISINTEGRATING ORAL at 06:09

## 2024-09-11 RX ADMIN — PHENAZOPYRIDINE 100 MG: 100 TABLET ORAL at 07:09

## 2024-09-11 RX ADMIN — OXYCODONE HYDROCHLORIDE AND ACETAMINOPHEN 1 TABLET: 5; 325 TABLET ORAL at 07:09

## 2024-09-11 RX ADMIN — KETOROLAC TROMETHAMINE 15 MG: 30 INJECTION, SOLUTION INTRAMUSCULAR; INTRAVENOUS at 06:09

## 2024-09-11 RX ADMIN — TAMSULOSIN HYDROCHLORIDE 0.4 MG: 0.4 CAPSULE ORAL at 07:09

## 2024-09-11 NOTE — ED PROVIDER NOTES
Encounter Date: 2024       History     Chief Complaint   Patient presents with    Back Pain     Patient reports sudden onset of left lower back pain, pain in right abdomen and feels like something is pushing out of her.     43-year-old female with history of kidney stones presenting with left flank pain that began today.  No fever, vomiting.  No dysuria or hematuria.  Patient does report some nausea.  Her pain radiates around to the left groin.      Review of patient's allergies indicates:   Allergen Reactions    Adhesive Hives    Latex Other (See Comments)     Past Medical History:   Diagnosis Date    Anxiety     Bulging disc     Chronic cystitis     Depression      Past Surgical History:   Procedure Laterality Date     SECTION, CLASSIC      x2    HYSTERECTOMY      SINUS SURGERY      TONSILLECTOMY       Family History   Problem Relation Name Age of Onset    Hypertension Mother       Social History     Tobacco Use    Smoking status: Never    Smokeless tobacco: Never   Substance Use Topics    Alcohol use: No    Drug use: Yes     Types: IV, Methamphetamines     Comment: last use 17     Review of Systems   Constitutional:  Negative for fever.   HENT:  Negative for sore throat.    Respiratory:  Negative for shortness of breath.    Cardiovascular:  Negative for chest pain.   Gastrointestinal:  Positive for nausea. Negative for abdominal pain, diarrhea and vomiting.   Genitourinary:  Positive for flank pain. Negative for dysuria.   Musculoskeletal:  Negative for back pain.   Skin:  Negative for rash.   Neurological:  Negative for weakness.   Hematological:  Does not bruise/bleed easily.       Physical Exam     Initial Vitals [24 0621]   BP Pulse Resp Temp SpO2   137/87 110 16 97.7 °F (36.5 °C) 100 %      MAP       --         Physical Exam    Nursing note and vitals reviewed.  Constitutional: She appears well-developed.   HENT:   Head: Normocephalic.   Eyes: Pupils are equal, round, and reactive to  light.   Neck:   Normal range of motion.  Cardiovascular:            No murmur heard.  Pulmonary/Chest: No respiratory distress.   Abdominal: Abdomen is soft.   No TTP diffusely. No guarding, rebound, or masses. Negative Arellano's sign. No TTP at McBurney's point. No CVAT bilaterally.     Musculoskeletal:         General: No edema.      Cervical back: Normal range of motion.     Neurological: She is alert.   Skin: Skin is warm.   Psychiatric: She has a normal mood and affect.         ED Course   Procedures  Labs Reviewed   URINALYSIS, REFLEX TO URINE CULTURE          Imaging Results    None          Medications   ketorolac injection 15 mg (has no administration in time range)   ondansetron disintegrating tablet 4 mg (has no administration in time range)     Medical Decision Making  DDX: Possible kidney stone. R/o UTI/pyelonephritis, infected stone. Less likely appendicitis/diverticulitis given benign abdomen, but will be screened. Otherwise likely muscular strain.  DX: UA. CTAP w/o contrast. Consider labs if large stone to assess for RENETTA.  TX: Analgesia, antiemetic PRN. IVF. Treatment/consult as indicated by studies.  DISPO: Pending studies, reassessment. If studies WNL or stable for outpatient management, symptoms controlled, discharge to follow up with PMD +/- urology within 1 week with supportive care recommendations and RTED precautions.        Risk  Prescription drug management.                                      Clinical Impression:  Final diagnoses:  [N20.0] Kidney stone (Primary)                 Sumit Quan MD  09/11/24 3528

## 2024-09-12 ENCOUNTER — TELEPHONE (OUTPATIENT)
Dept: EMERGENCY MEDICINE | Facility: HOSPITAL | Age: 44
End: 2024-09-12
Payer: MEDICAID

## 2024-09-12 DIAGNOSIS — N20.0 KIDNEY STONE: Primary | ICD-10-CM

## 2024-09-12 RX ORDER — OXYCODONE AND ACETAMINOPHEN 5; 325 MG/1; MG/1
1 TABLET ORAL EVERY 6 HOURS PRN
Qty: 12 TABLET | Refills: 0 | Status: SHIPPED | OUTPATIENT
Start: 2024-09-12

## 2024-09-12 RX ORDER — PHENAZOPYRIDINE HYDROCHLORIDE 200 MG/1
200 TABLET, FILM COATED ORAL 3 TIMES DAILY PRN
Qty: 15 TABLET | Refills: 0 | Status: SHIPPED | OUTPATIENT
Start: 2024-09-12 | End: 2024-09-22

## 2024-09-12 RX ORDER — TAMSULOSIN HYDROCHLORIDE 0.4 MG/1
0.4 CAPSULE ORAL DAILY
Qty: 10 CAPSULE | Refills: 0 | Status: SHIPPED | OUTPATIENT
Start: 2024-09-12 | End: 2025-09-12

## 2024-10-03 ENCOUNTER — ANESTHESIA EVENT (OUTPATIENT)
Dept: SURGERY | Facility: HOSPITAL | Age: 44
End: 2024-10-03
Payer: MEDICAID

## 2024-10-03 RX ORDER — FENTANYL CITRATE 50 UG/ML
25 INJECTION, SOLUTION INTRAMUSCULAR; INTRAVENOUS EVERY 5 MIN PRN
Status: CANCELLED | OUTPATIENT
Start: 2024-10-03

## 2024-10-03 RX ORDER — HALOPERIDOL 5 MG/ML
0.5 INJECTION INTRAMUSCULAR EVERY 10 MIN PRN
Status: CANCELLED | OUTPATIENT
Start: 2024-10-03

## 2024-10-03 NOTE — ANESTHESIA PREPROCEDURE EVALUATION
[facility]  Anesthesia Pre-Operative Evaluation        Patient Name: Serene Loaiza  YOB: 1980  MRN: [id number]    SUBJECTIVE:     Pre-operative Evaluation for Procedure(s) (LRB):  RELEASE, HAND, FOR DEQUERVAIN'S TENOSYNOVITIS (Right)     10/03/2024    Serene Loaiza is a 43 y.o. female with a PMHx significant for DeQuervains, latex/tape allergy.     The patient now presents for the above procedure(s).    Previous Airway: None documented.    Patient Active Problem List   Diagnosis    Lumbar disc disease    Lumbar discogenic pain syndrome    Lumbar facet arthropathy    Weakness    Pelvic pain    Incontinence    RSD (reflex sympathetic dystrophy)    Chronic midline low back pain without sciatica       Past Medical History:   Diagnosis Date    Anxiety     Bulging disc     Chronic cystitis     Depression        Review of patient's allergies indicates:   Allergen Reactions    Adhesive Hives    Latex Other (See Comments)       Current Outpatient Medications   Medication Instructions    acetaminophen (TYLENOL) 500 mg, Oral, Every 6 hours PRN    diclofenac sodium (VOLTAREN) 2 g, Topical (Top), 4 times daily PRN    ibuprofen (ADVIL,MOTRIN) 600 mg, Oral, Every 6 hours PRN    ibuprofen (ADVIL,MOTRIN) 800 mg, Oral, 3 times daily    ibuprofen (ADVIL,MOTRIN) 600 mg, Oral, Every 6 hours PRN    ondansetron (ZOFRAN-ODT) 4 mg, Oral, Every 8 hours PRN    oxyCODONE-acetaminophen (PERCOCET) 5-325 mg per tablet 1 tablet, Oral, Every 6 hours PRN    tamsulosin (FLOMAX) 0.4 mg, Oral, Daily       Past Surgical History:   Procedure Laterality Date     SECTION, CLASSIC      x2    HYSTERECTOMY      SINUS SURGERY      TONSILLECTOMY         Social History     Substance and Sexual Activity   Drug Use Yes    Types: IV, Methamphetamines    Comment: last use 17     Alcohol Use: Not At Risk (2022)    Received from [facility]    AUDIT-C     Frequency of Alcohol Consumption: Never     Average Number of  "Drinks: Patient declined     Frequency of Binge Drinking: Never     Tobacco Use: Low Risk  (9/11/2024)    Patient History     Smoking Tobacco Use: Never     Smokeless Tobacco Use: Never     Passive Exposure: Not on file       OBJECTIVE:     Vital Signs Range (Last 24H):  BP: ()/()   Arterial Line BP: ()/()       Significant Labs    Heme Profile  Lab Results   Component Value Date    WBC 7.35 04/15/2009    HGB 12.9 04/15/2009    HCT 39.9 04/15/2009     04/15/2009       Coagulation Studies  No results found for: "LABPROT", "INR", "APTT"    BMP  Lab Results   Component Value Date     02/14/2023    K 4.2 02/14/2023     04/25/2007    CO2 22 02/14/2023    BUN 10 02/14/2023    CREATININE 0.65 02/14/2023       Liver Function Tests  Lab Results   Component Value Date    AST 19 02/14/2023    ALT 20 02/14/2023    ALKPHOS 64 02/14/2023    BILITOT 0.5 02/14/2023    PROT 7.7 04/25/2007    ALBUMIN 4.6 02/14/2023       Lipid Profile  No results found for: "CHOL", "HDL", "LDLDIRECT", "TRIG"    Endocrine Profile  Lab Results   Component Value Date    TSH 1.1 07/12/2005         Cardiac Studies    EKG:   No results found for this or any previous visit.    ROLO  No results found for this or any previous visit.      TTE  No results found for this or any previous visit.      Nuclear Stress Test   No results found for this or any previous visit.        ASSESSMENT/PLAN:           Pre-op Assessment    I have reviewed the Patient Summary Reports.     I have reviewed the Nursing Notes. I have reviewed the NPO Status.   I have reviewed the Medications.     Review of Systems  Anesthesia Hx:               Denies Personal Hx of Anesthesia complications.                    Neurological:    Neuromuscular Disease,                                 Neuromuscular Disease   Psych:  Psychiatric History                  Physical Exam  General: Well nourished, Cooperative, Alert and Oriented    Airway:  Mallampati: I   Mouth Opening: " Normal  TM Distance: Normal  Tongue: Normal  Neck ROM: Normal ROM    Dental:  Intact        Anesthesia Plan  Type of Anesthesia, risks & benefits discussed:    Anesthesia Type: Gen Natural Airway  Intra-op Monitoring Plan: Standard ASA Monitors  Post Op Pain Control Plan: multimodal analgesia and IV/PO Opioids PRN  Induction:  IV  Airway Plan: Direct  Informed Consent: Informed consent signed with the Patient and all parties understand the risks and agree with anesthesia plan.  All questions answered.   ASA Score: 2  Day of Surgery Review of History & Physical: H&P Update referred to the surgeon/provider.    Ready For Surgery From Anesthesia Perspective.     .

## 2024-10-04 ENCOUNTER — HOSPITAL ENCOUNTER (OUTPATIENT)
Facility: HOSPITAL | Age: 44
Discharge: HOME | End: 2024-10-04
Attending: ORTHOPAEDIC SURGERY | Admitting: ORTHOPAEDIC SURGERY
Payer: MEDICAID

## 2024-10-04 ENCOUNTER — ANESTHESIA (OUTPATIENT)
Dept: SURGERY | Facility: HOSPITAL | Age: 44
End: 2024-10-04
Payer: MEDICAID

## 2024-10-04 VITALS
DIASTOLIC BLOOD PRESSURE: 74 MMHG | HEIGHT: 64 IN | OXYGEN SATURATION: 99 % | HEART RATE: 77 BPM | WEIGHT: 172 LBS | BODY MASS INDEX: 29.37 KG/M2 | SYSTOLIC BLOOD PRESSURE: 117 MMHG | TEMPERATURE: 98 F | RESPIRATION RATE: 18 BRPM

## 2024-10-04 DIAGNOSIS — M65.4 DE QUERVAIN'S TENOSYNOVITIS, RIGHT: Primary | ICD-10-CM

## 2024-10-04 PROCEDURE — 37000008 HC ANESTHESIA 1ST 15 MINUTES: Performed by: ORTHOPAEDIC SURGERY

## 2024-10-04 PROCEDURE — 36000706: Performed by: ORTHOPAEDIC SURGERY

## 2024-10-04 PROCEDURE — 71000015 HC POSTOP RECOV 1ST HR: Performed by: ORTHOPAEDIC SURGERY

## 2024-10-04 PROCEDURE — 63600175 PHARM REV CODE 636 W HCPCS: Mod: JZ,JG | Performed by: ORTHOPAEDIC SURGERY

## 2024-10-04 PROCEDURE — 37000009 HC ANESTHESIA EA ADD 15 MINS: Performed by: ORTHOPAEDIC SURGERY

## 2024-10-04 PROCEDURE — 25000 INCISION OF TENDON SHEATH: CPT | Mod: RT,,, | Performed by: ORTHOPAEDIC SURGERY

## 2024-10-04 PROCEDURE — 36000707: Performed by: ORTHOPAEDIC SURGERY

## 2024-10-04 PROCEDURE — 71000016 HC POSTOP RECOV ADDL HR: Performed by: ORTHOPAEDIC SURGERY

## 2024-10-04 PROCEDURE — 63600175 PHARM REV CODE 636 W HCPCS

## 2024-10-04 RX ORDER — MUPIROCIN 20 MG/G
OINTMENT TOPICAL
Status: DISCONTINUED | OUTPATIENT
Start: 2024-10-04 | End: 2024-10-04 | Stop reason: HOSPADM

## 2024-10-04 RX ORDER — ONDANSETRON HYDROCHLORIDE 2 MG/ML
INJECTION, SOLUTION INTRAVENOUS
Status: DISCONTINUED | OUTPATIENT
Start: 2024-10-04 | End: 2024-10-04

## 2024-10-04 RX ORDER — BUPIVACAINE HYDROCHLORIDE 5 MG/ML
INJECTION, SOLUTION EPIDURAL; INTRACAUDAL
Status: DISCONTINUED | OUTPATIENT
Start: 2024-10-04 | End: 2024-10-04 | Stop reason: HOSPADM

## 2024-10-04 RX ORDER — GLUCAGON 1 MG
1 KIT INJECTION
Status: DISCONTINUED | OUTPATIENT
Start: 2024-10-04 | End: 2024-10-04 | Stop reason: HOSPADM

## 2024-10-04 RX ORDER — LIDOCAINE HYDROCHLORIDE 10 MG/ML
INJECTION, SOLUTION EPIDURAL; INFILTRATION; INTRACAUDAL; PERINEURAL
Status: DISCONTINUED | OUTPATIENT
Start: 2024-10-04 | End: 2024-10-04 | Stop reason: HOSPADM

## 2024-10-04 RX ORDER — HYDROCODONE BITARTRATE AND ACETAMINOPHEN 5; 325 MG/1; MG/1
1 TABLET ORAL EVERY 4 HOURS PRN
Qty: 20 TABLET | Refills: 0 | Status: SHIPPED | OUTPATIENT
Start: 2024-10-04

## 2024-10-04 RX ORDER — SODIUM CHLORIDE 0.9 % (FLUSH) 0.9 %
3 SYRINGE (ML) INJECTION EVERY 6 HOURS PRN
Status: DISCONTINUED | OUTPATIENT
Start: 2024-10-04 | End: 2024-10-04 | Stop reason: HOSPADM

## 2024-10-04 RX ORDER — MIDAZOLAM HYDROCHLORIDE 1 MG/ML
INJECTION INTRAMUSCULAR; INTRAVENOUS
Status: DISCONTINUED | OUTPATIENT
Start: 2024-10-04 | End: 2024-10-04

## 2024-10-04 RX ORDER — ONDANSETRON 8 MG/1
8 TABLET, ORALLY DISINTEGRATING ORAL EVERY 8 HOURS PRN
Status: DISCONTINUED | OUTPATIENT
Start: 2024-10-04 | End: 2024-10-04 | Stop reason: HOSPADM

## 2024-10-04 RX ORDER — ACETAMINOPHEN 325 MG/1
650 TABLET ORAL EVERY 4 HOURS PRN
Status: DISCONTINUED | OUTPATIENT
Start: 2024-10-04 | End: 2024-10-04 | Stop reason: HOSPADM

## 2024-10-04 RX ORDER — KETOROLAC TROMETHAMINE 30 MG/ML
30 INJECTION, SOLUTION INTRAMUSCULAR; INTRAVENOUS ONCE
Status: COMPLETED | OUTPATIENT
Start: 2024-10-04 | End: 2024-10-04

## 2024-10-04 RX ORDER — PREGABALIN 50 MG/1
50 CAPSULE ORAL 2 TIMES DAILY
Qty: 60 CAPSULE | Refills: 1 | Status: SHIPPED | OUTPATIENT
Start: 2024-10-04 | End: 2025-05-02

## 2024-10-04 RX ORDER — PROPOFOL 10 MG/ML
INJECTION, EMULSION INTRAVENOUS
Status: DISCONTINUED | OUTPATIENT
Start: 2024-10-04 | End: 2024-10-04

## 2024-10-04 RX ORDER — OXYCODONE HYDROCHLORIDE 5 MG/1
10 TABLET ORAL EVERY 4 HOURS PRN
Status: DISCONTINUED | OUTPATIENT
Start: 2024-10-04 | End: 2024-10-04 | Stop reason: HOSPADM

## 2024-10-04 RX ORDER — SODIUM CHLORIDE, SODIUM LACTATE, POTASSIUM CHLORIDE, CALCIUM CHLORIDE 600; 310; 30; 20 MG/100ML; MG/100ML; MG/100ML; MG/100ML
INJECTION, SOLUTION INTRAVENOUS CONTINUOUS PRN
Status: DISCONTINUED | OUTPATIENT
Start: 2024-10-04 | End: 2024-10-04

## 2024-10-04 RX ADMIN — MIDAZOLAM HYDROCHLORIDE 2 MG: 1 INJECTION, SOLUTION INTRAMUSCULAR; INTRAVENOUS at 09:10

## 2024-10-04 RX ADMIN — PROPOFOL 50 MG: 10 INJECTION, EMULSION INTRAVENOUS at 09:10

## 2024-10-04 RX ADMIN — PROPOFOL 20 MG: 10 INJECTION, EMULSION INTRAVENOUS at 09:10

## 2024-10-04 RX ADMIN — PROPOFOL 125 MCG/KG/MIN: 10 INJECTION, EMULSION INTRAVENOUS at 09:10

## 2024-10-04 RX ADMIN — SODIUM CHLORIDE, SODIUM LACTATE, POTASSIUM CHLORIDE, AND CALCIUM CHLORIDE: .6; .31; .03; .02 INJECTION, SOLUTION INTRAVENOUS at 09:10

## 2024-10-04 RX ADMIN — PROPOFOL 30 MG: 10 INJECTION, EMULSION INTRAVENOUS at 09:10

## 2024-10-04 RX ADMIN — ONDANSETRON 4 MG: 2 INJECTION, SOLUTION INTRAMUSCULAR; INTRAVENOUS at 10:10

## 2024-10-04 RX ADMIN — KETOROLAC TROMETHAMINE 30 MG: 30 INJECTION, SOLUTION INTRAMUSCULAR; INTRAVENOUS at 11:10

## 2024-10-04 NOTE — H&P
Patient ID: Serene Loaiza is a 43 y.o. female.     Chief Complaint: Pain of the Right Wrist        HPI: Serene Loaiza is a 43 y.o. right hand dominant female who presents to clinic for follow up of right wrist/thumb pain. Patient was last seen by myself on 08/06/2024 for this and MRI was ordered. She denies a history of trauma, but states on 08/04/2024 she was reaching for something and felt a tearing sensation in her wrist.  Patient was previously seen and treated with corticosteroid injection x4 by Dr. Jensen Marinelli for DeQuervains.  Patient states her primary concern is the pain and continued popping of the thumb. The pain is aggravated by picking up her grand kids and any use of the right wrist/thumb. Patient has recently been wearing thumb spica brace, taking Ibuprofen 600 mg as needed, and trying to avoid lifting grandkids with some relief.  Denies weakness, numbness, tingling, radiation. The pain is affecting ADLs and limiting desired level of activity.  Patient presents today to review MRI results.     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  History of DVT/PE: Negative        PAST MEDICAL HISTORY:         Past Medical History:   Diagnosis Date    Anxiety      Bulging disc      Chronic cystitis      Depression        MEDICATIONS:   Current Medications   Current Outpatient Medications:     acetaminophen (TYLENOL) 500 MG tablet, Take 1 tablet (500 mg total) by mouth every 6 (six) hours as needed for Pain (As needed for mild-to-moderate pain)., Disp: 30 tablet, Rfl: 0    diclofenac sodium (VOLTAREN) 1 % Gel, Apply 2 g topically 4 (four) times daily as needed (Apply to painful area 4 times a day as needed for pain)., Disp: 200 g, Rfl: 0    ibuprofen (ADVIL,MOTRIN) 600 MG tablet, Take 1 tablet (600 mg total) by mouth every 6 (six) hours as needed for Pain (Take with food as needed for mild-to-moderate pain)., Disp: 20 tablet, Rfl: 0     ondansetron (ZOFRAN-ODT) 4 MG TbDL, Take 1 tablet (4 mg total) by mouth every 8 (eight) hours as needed (nausea)., Disp: 20 tablet, Rfl: 0    ibuprofen (ADVIL,MOTRIN) 800 MG tablet, Take 1 tablet (800 mg total) by mouth 3 (three) times daily., Disp: 90 tablet, Rfl: 1        ALLERGIES:        Review of patient's allergies indicates:   Allergen Reactions    Adhesive Hives    Latex Other (See Comments)         Review of Systems:  Constitution: Negative for chills, fever and night sweats.   HENT: Negative for congestion and headaches.    Eyes: Negative for blurred vision or vision loss.  Cardiovascular: Negative for chest pain and syncope.   Respiratory: Negative for cough and shortness of breath.    Endocrine: Negative for polydipsia, polyphagia and polyuria.   Hematologic/Lymphatic: Negative for bleeding problem. Does not bruise/bleed easily.   Skin: Negative for dry skin, itching and rash.   Musculoskeletal: See HPI.   Gastrointestinal: Negative for abdominal pain and bowel incontinence.   Genitourinary: Negative for bladder incontinence and nocturia.   Neurological: Negative for disturbances in coordination, loss of balance and seizures.   Psychiatric/Behavioral: Negative for depression. The patient does not have insomnia.    Allergic/Immunologic: Negative for hives and persistent infections.             Objective:      Objective  There were no vitals filed for this visit.     PHYSICAL EXAM:  General: Alert & oriented x3, well-developed and well-nourished, in no acute distress, sitting comfortably in the exam room.  Skin: Warm and dry. Capillary refill less than 2 seconds.   Head: Normocephalic and atraumatic.   Eyes: Sclera appear normal.   Nose: No deformities seen.   Ears: No deformities seen.   Neck: No tracheal deviation present.   Pulmonary/Chest: Breathing unlabored.   Neurological: Alert and oriented to person, place, and time.   Psychiatric: Mood is pleasant and affect appropriate.         RIGHT  HAND/WRIST:        Observation/Inspection:    No evidence of visible deformity, swelling, discoloration, scars, or atrophy.         Palpation:   Mild tenderness to palpation to the radial wrist.  No tenderness to palpation to the ulnar wrist.   Otherwise, negative tenderness to palpation to bony prominences and soft tissues throughout.        Range of Motion:  Wrist:   Full to wrist flexion, extension, radial, and ulnar deviation.   Digits:  Full to digit MCP, PIP, and DIP flexion and extension. Pain reported with thumb ROM.        Special Tests:  Finkelstein's Test        Positive  WHAT Test                 Positive         Strength:               strength not tested today.         Neurovascular Exam:  Digits warm and well perfused, brisk capillary refill <3 seconds throughout  NVI motor/LTS to median, radial, and ulnar nerves, radial pulse 2+        Imaging:   MRI right wrist dated 08/15/2024:   De Quervain tenosynovitis.  Partial tear of the dorsal scapholunate ligament  Partial tear of the articular disc of the TFC        Assessment:      Assessment  1. De Quervain's tenosynovitis, right          Plan:      Plan      Orders Placed This Encounter    ibuprofen (ADVIL,MOTRIN) 800 MG tablet    Case Request Operating Room: RELEASE, HAND, FOR DEQUERVAIN'S TENOSYNOVITIS         I personally discussed this case and reviewed imaging with supervising physician, Dr. Moreno.      I explained the nature of the problem to the patient.      I discussed at length with the patient all the different treatment options available for her right thumb including anti-inflammatories, acetaminophen, rest, ice, heat, physical/occupational therapy, and corticosteroid injections. I explained the potential role of surgery in the treatment of this condition. The patient understands that if non-surgical measures do not adequately control symptoms, surgery will be considered in the future.      Dr. Moreno and I recommend: Due to patient  already failing conservative treatment including corticosteroid injection x4, bracing, NSAIDs, activity modification we will now proceed with surgical intervention. Patient would like to proceed with right DeQuervain release as an outpatient surgery. The risks and benefits of surgery were discussed with the patient today and she verbalized understand. Surgical consents were signed. Orders for surgery were entered. Surgery is scheduled for 10/04/2024.      Medications:  Prescription for Ibuprofen (Motrin) 800 mg TID provided today for PRN pain management. Patient understands to take with food and/or OTC prilosec to decrease GI side effects. Advised patient to refrain from taking other anti-inflammatory medications while taking this medication, however she may alternate with acetaminophen as needed.  Procedures:  None today.   DME:  Continue use of thumb spica brace.  Okay to remove the brace for hygiene, showering, and gentle ROM exercises.  Activity Modification:  Avoid aggravating factors such as lifting grandchildren.  Pain Management: Ice compress to the affected area 2-3x a day for 15-20 minutes as needed for pain management.     Follow-Up: After surgery for POV #1

## 2024-10-04 NOTE — OPERATIVE NOTE ADDENDUM
Certification of Assistant at Surgery       Surgery Date: 10/4/2024     Participating Surgeons:  Surgeons and Role:     * Terrance Moreno Jr., MD - Primary    Procedures:  Procedure(s) (LRB):  RELEASE, HAND, FOR DEQUERVAIN'S TENOSYNOVITIS (Right)    Assistant Surgeon's Certification of Necessity:  I understand that section 1842 (b) (6) (d) of the Social Security Act generally prohibits Medicare Part B reasonable charge payment for the services of assistants at surgery in teaching hospitals when qualified residents are available to furnish such services. I certify that the services for which payment is claimed were medically necessary, and that no qualified resident was available to perform the services. I further understand that these services are subject to post-payment review by the Medicare carrier.      Tia Sagastume PA-C    10/04/2024  10:20 AM

## 2024-10-04 NOTE — OP NOTE
[facility])  Operative Note      Date of Procedure: 10/4/2024     Procedure: Procedure(s) (LRB):  RELEASE, HAND, FOR DEQUERVAIN'S TENOSYNOVITIS (Right)     Surgeons and Role:     * Terrance Moreno Jr., MD - Primary    Assisting Surgeon: None    Pre-Operative Diagnosis: De Quervain's tenosynovitis, right [M65.4]    Post-Operative Diagnosis: Post-Op Diagnosis Codes:     * De Quervain's tenosynovitis, right [M65.4]    Anesthesia: Local MAC    Operative Findings (including complications, if any): Dequervains tendonitis    Description of Technical Procedures: DATE OF PROCEDURE:   10/4/2024     PREOPERATIVE DIAGNOSIS:  right wrist de Quervain's tendinitis.     POSTOPERATIVE DIAGNOSES: right wrist De Quervain's tendinitis.     OPERATIVE PROCEDURE:  Release of first dorsal compartment,right wrist.     SURGEON:  Terrance Moreno Jr., MLUCIANO     FIRST ASSISTANT:  Tanika     ANESTHESIA:  MAC.     ESTIMATED BLOOD LOSS:  Minimal.     COMPLICATIONS:  None.     SPECIMENS:  None.     BRIEF INDICATIONS:  A 43-year-old female with De Quervain tendinitis, unresponsive to conservative treatment.     OPERATIVE PROCEDURE IN DETAIL:  After operative consent was obtained, the   patient brought to the Operating Room, placed supine on the operating room   table.  Anesthesia by IV sedation followed by injection of Xylocaine, Marcaine   combination at the operative site.  Following this, tourniquet applied to the   arm.  The arm then prepped and draped out in the normal sterile fashion.  The   Esmarch used to exsanguinate the limb and the tourniquet inflated to 225 mmHg.     Following this, a transverse radial incision made with a #15 blade directly over   the first dorsal compartment of the wrist.  Careful dissection was used to   identify the superficial radial nerve branches, which were protected throughout   the procedure.  The first dorsal compartment was then opened longitudinally on   the dorsal aspect of the [place]  blade.  The APL tendon was identified, but a   second subsheath compartment was also found and this was released with the   [place] blade as well.  All 3 tendons slips were then carefully freed up and   freed proximally and distally to prevent any impingement.  The volar flap of   tissue was left intact to prevent any subluxation of the tendons.  The wound   irrigated thoroughly.  Hemostasis achieved with a Bovie.  The skin closed with   interrupted 4-0 Monocryl on the subcutaneous layer and Steri-Strips on the skin.    Sterile dressing applied followed by a Velcro wrist splint.  The tourniquet   deflated.  The patient was brought to the Recovery Room in stable condition.    All sponge and needle counts reported as correct.  No complications.          Significant Surgical Tasks Conducted by the Assistant(s), if Applicable: retraction    Estimated Blood Loss (EBL): * No values recorded between 10/4/2024 10:01 AM and 10/4/2024 10:19 AM *           Implants: * No implants in log *    Specimens:   Specimen (24h ago, onward)      None                    Condition: Good    Disposition: PACU - hemodynamically stable.    Attestation: I was present and scrubbed for the entire procedure.    Discharge Note    OUTCOME: Patient tolerated treatment/procedure well without complication and is now ready for discharge.    DISPOSITION: Home or Self Care    FINAL DIAGNOSIS:  De Quervain's tenosynovitis, right    FOLLOWUP: In clinic    DISCHARGE INSTRUCTIONS:    Discharge Procedure Orders   Diet general     Call MD for:  temperature >100.4     Call MD for:  persistent nausea and vomiting     Call MD for:  severe uncontrolled pain     Keep surgical extremity elevated     Remove dressing in 72 hours

## 2024-10-04 NOTE — ANESTHESIA POSTPROCEDURE EVALUATION
Anesthesia Post Evaluation    Patient: Serene Loaiza    Procedure(s) Performed: Procedure(s) (LRB):  RELEASE, HAND, FOR DEQUERVAIN'S TENOSYNOVITIS (Right)    Final Anesthesia Type: general      Patient location during evaluation: PACU  Patient participation: Yes- Able to Participate  Level of consciousness: awake and alert and oriented  Post-procedure vital signs: reviewed and stable  Pain management: adequate  Airway patency: patent    PONV status at discharge: No PONV  Anesthetic complications: no      Cardiovascular status: hemodynamically stable  Respiratory status: spontaneous ventilation and unassisted  Hydration status: euvolemic  Follow-up not needed.              Vitals Value Taken Time   /72 10/04/24 1329   Temp 36.5 10/04/24 1329   Pulse 75 10/04/24 1329   Resp 15 10/04/24 1329   SpO2 99% 10/04/24 1329         No case tracking events are documented in the log.      Pain/Kae Score: 8-9/10 Pain Rating Prior to Med Admin: 2 (10/4/2024 11:31 AM)

## 2024-10-04 NOTE — DISCHARGE INSTRUCTIONS
SEE PRESCRIPTION INSERTS FOR INFORMATION ON YOUR MEDICATIONS     After Hand Surgery  After surgery, the better you take care of yourself--especially your hand--the sooner it will heal. Follow your surgeons instructions. Try not to bump your hand, and dont move or lift anything while youre still wearing bandages, a splint, or a cast.  Care for your hand    Keep your hand elevated above heart level as much as possible for the first several days after surgery. This helps reduce swelling and pain.  To help prevent infection and speed healing, take care not to get your cast or bandages wet.  LEAVE DRESSING IN PLACE FOR 72 HOURS, AFTER 72 HOURS YOU CAN SHOWER AND LIGHTLY GET INCISION WET WITH SOAP AND WATER, YOU CAN COVER INCISION WITH BAND AID AS NEEDED AND CLEAN WITH ALCOHOL DAILY AFTER SHOWER AND LET AIR DRY BEFORE PUTTING ON A BAND AID  Relieve pain as directed  Your surgeon may prescribe pain medicine or suggest you take an anti-inflammatory medicine. You might also be instructed to apply ice (or another cold source) to your hand. If you use ice cubes, put them in a plastic bag and rest it on top of your bandages. Leave the cold source on your hand for as long as its comfortable. Do this several times a day for the first few days after surgery. It may take several minutes before you can feel the cold through the cast or bandages.  Follow up with your surgeon  During a follow-up visit after surgery, your surgeon will check your progress. The stitches, bandages, splint, or cast may be removed. A new cast or splint may be placed. If your hand has healed enough, your surgeon may prescribe exercises.  Do prescribed hand exercises  Your surgeon may recommend that you do exercises. These may be done under the guidance of a physical or occupational therapist. The exercises strengthen your hand, help you regain flexibility, and restore proper function. Do the exercises as advised.  Call your surgeon if you have...  A  fever higher than 100.4°F (38.0°C) taken by mouth  Side effects from your medicine, such as prolonged nausea  A wet or loose dressing, or a dressing that is too tight  Excessive bleeding  Increased, ongoing pain or numbness  Signs of infection (such as drainage, warmth, or redness) at the incision site      ANESTHESIA  -For the first 24 hours after surgery:  Do not drive, use heavy equipment, make important decisions, or drink alcohol  -It is normal to feel sleepy for several hours.  Rest until you are more awake.  -Have someone stay with you, if needed.  They can watch for problems and help keep you safe.  -Some possible post anesthesia side effects include: nausea and vomiting, sore throat and hoarseness, sleepiness, and dizziness.    PAIN  -If you have pain after surgery, pain medicine will help you feel better.  Take it as directed, before pain becomes severe.  Most pain relievers taken by mouth need at least 20-30 minutes to start working.  -Do not drive or drink alcohol while taking pain medicine.  -Pain medication can upset your stomach.  Taking them with a little food may help.  -Other ways to help control pain: elevation, ice, and relaxation  -Call your surgeon if still having unmanageable pain an hour after taking pain medicine.  -Pain medicine can cause constipation.  Taking an over-the counter stool softener while on prescription pain medicine and drinking plenty of fluids can prevent this side effect.  -Call your surgeon if you have severe side effects like: breathing problems, trouble waking up, dizziness, confusion, or severe constipation.    NAUSEA  -Some people have nausea after surgery.  This is often because of anesthesia, pain, pain medicine, or the stress of surgery.  -Do not push yourself to eat.  Start off with clear liquids and soup.  Slowly move to solid foods.  Don't eat fatty, rich, spicy foods at first.  Eat smaller amounts.  -If you develop persistent nausea and vomiting please notify  your surgeon immediately.    BLEEDING  -Different types of surgery require different types of care and dressing changes.  It is important to follow all instructions and advice from your surgeon.  Change dressing as directed.  Call your surgeon for any concerns regarding postop bleeding.    SIGNS OF INFECTION  -Signs of infection include: fever, swelling, drainage, and redness  -Notify your surgeon if you have a fever of 100.4 F (38.0 C) or higher.  -Notify your surgeon if you notice redness, swelling, increased pain, pus, or a foul smell at the incision site.

## 2024-10-04 NOTE — TRANSFER OF CARE
"Anesthesia Transfer of Care Note    Patient: Serene Loaiza    Procedure(s) Performed: Procedure(s) (LRB):  RELEASE, HAND, FOR DEQUERVAIN'S TENOSYNOVITIS (Right)    Patient location: PACU    Anesthesia Type: MAC    Transport from OR: Transported from OR on 6-10 L/min O2 by face mask with adequate spontaneous ventilation    Post pain: adequate analgesia    Post assessment: no apparent anesthetic complications    Post vital signs: stable    Level of consciousness: awake    Nausea/Vomiting: no nausea/vomiting    Complications: none    Transfer of care protocol was followed      Last vitals: Visit Vitals  /75 (BP Location: Right arm, Patient Position: Lying)   Pulse 79   Temp 36.6 °C (97.9 °F) (Tympanic)   Resp 16   Ht 5' 4" (1.626 m)   Wt 78 kg (172 lb)   LMP 09/09/2013   SpO2 99%   Breastfeeding No   BMI 29.52 kg/m²     "

## 2024-10-24 ENCOUNTER — TELEPHONE (OUTPATIENT)
Dept: ORTHOPEDICS | Facility: CLINIC | Age: 44
End: 2024-10-24
Payer: MEDICAID

## 2024-10-24 NOTE — TELEPHONE ENCOUNTER
Return pt call. Offered pt first available on 10/25 for pov right deQuervain . Pt declines offer and asked if she can be seen the following week. Offered 10/31 at 9:30 with vasu rivera pa-c at 9:30am. Pt declined that offer and asked if she can be seen after 1:00. Explained to pt that would be the only available. Offered pt 11/04 at 3:00 with  . Pt agreed and gave verbal understanding.call ended . ----- Message from Michelle sent at 10/24/2024 12:51 PM CDT -----  Regarding: appt  Type:Patient Returning Call:Pt        Who Called: MA        Who Left Message for Patient:         Does the patient know what this is regarding? Reschedule for missed appt         Best Call Back Number: Telephone Information:  Mobile          [phone]            Additional Information:Pt doesn't have access to portal please contact her

## 2024-11-04 ENCOUNTER — OFFICE VISIT (OUTPATIENT)
Dept: ORTHOPEDICS | Facility: CLINIC | Age: 44
End: 2024-11-04
Payer: MEDICAID

## 2024-11-04 VITALS — HEIGHT: 64 IN | BODY MASS INDEX: 29.52 KG/M2

## 2024-11-04 DIAGNOSIS — M65.4 DE QUERVAIN'S TENOSYNOVITIS, RIGHT: Primary | ICD-10-CM

## 2024-11-04 PROCEDURE — 1159F MED LIST DOCD IN RCRD: CPT | Mod: CPTII,,, | Performed by: ORTHOPAEDIC SURGERY

## 2024-11-04 PROCEDURE — 99024 POSTOP FOLLOW-UP VISIT: CPT | Mod: ,,, | Performed by: ORTHOPAEDIC SURGERY

## 2024-11-04 PROCEDURE — 99213 OFFICE O/P EST LOW 20 MIN: CPT | Mod: PBBFAC,PN | Performed by: ORTHOPAEDIC SURGERY

## 2024-11-04 PROCEDURE — 99999 PR PBB SHADOW E&M-EST. PATIENT-LVL III: CPT | Mod: PBBFAC,,, | Performed by: ORTHOPAEDIC SURGERY

## 2024-11-04 NOTE — PROGRESS NOTES
Subjective:      Patient ID: Serene Loaiza is a 43 y.o. female.  Chief Complaint: Post-op Evaluation (Right De Quervain's , 2 wk post op)      HPI  Serene Loaiza is a  43 y.o. female presenting today for post op visit.  She is s/p right wrist de Quervain tendinitis now 2 weeks postop she is doing well mild soreness reported no numbness reported in the right hand   On unrelated matter she is having ongoing symptoms left hand including numbness tingling which is chronic.     Review of patient's allergies indicates:   Allergen Reactions    Adhesive Hives    Latex Other (See Comments)         Current Outpatient Medications   Medication Sig Dispense Refill    acetaminophen (TYLENOL) 500 MG tablet Take 1 tablet (500 mg total) by mouth every 6 (six) hours as needed for Pain (As needed for mild-to-moderate pain). 30 tablet 0    diclofenac sodium (VOLTAREN) 1 % Gel Apply 2 g topically 4 (four) times daily as needed (Apply to painful area 4 times a day as needed for pain). 200 g 0    HYDROcodone-acetaminophen (NORCO) 5-325 mg per tablet Take 1 tablet by mouth every 4 (four) hours as needed for Pain. (Patient not taking: Reported on 11/4/2024) 20 tablet 0    ibuprofen (ADVIL,MOTRIN) 600 MG tablet Take 1 tablet (600 mg total) by mouth every 6 (six) hours as needed for Pain (Take with food as needed for mild-to-moderate pain). 20 tablet 0    ibuprofen (ADVIL,MOTRIN) 600 MG tablet Take 1 tablet (600 mg total) by mouth every 6 (six) hours as needed for Pain. 20 tablet 0    ibuprofen (ADVIL,MOTRIN) 800 MG tablet Take 1 tablet (800 mg total) by mouth 3 (three) times daily. (Patient not taking: Reported on 11/4/2024) 90 tablet 1    ondansetron (ZOFRAN-ODT) 4 MG TbDL Take 1 tablet (4 mg total) by mouth every 8 (eight) hours as needed (nausea). 20 tablet 0    oxyCODONE-acetaminophen (PERCOCET) 5-325 mg per tablet Take 1 tablet by mouth every 6 (six) hours as needed for Pain. 12 tablet 0    pregabalin (LYRICA) 50 MG capsule Take 1  "capsule (50 mg total) by mouth 2 (two) times daily. (Patient not taking: Reported on 2024) 60 capsule 1    tamsulosin (FLOMAX) 0.4 mg Cap Take 1 capsule (0.4 mg total) by mouth once daily. 10 capsule 0     No current facility-administered medications for this visit.       Past Medical History:   Diagnosis Date    Anxiety     Bulging disc     Chronic cystitis     Depression        Past Surgical History:   Procedure Laterality Date     SECTION, CLASSIC      x2    DE QUERVAIN'S RELEASE Right 10/4/2024    Procedure: RELEASE, HAND, FOR DEQUERVAIN'S TENOSYNOVITIS;  Surgeon: Terrance Moreno Jr., MD;  Location: [facility];  Service: Orthopedics;  Laterality: Right;    HYSTERECTOMY      SINUS SURGERY      TONSILLECTOMY         OBJECTIVE:   PHYSICAL EXAM:  Height: 5' 4" (162.6 cm)    Vitals:    24 1437   Height: 5' 4" (1.626 m)   PainSc: 0-No pain   PainLoc: Hand     Ortho/SPM Exam  Examination right hand and wrist incision looks good healing well minimal swelling minimal tenderness no evidence of infection range of motion fingers full   Examination opposite left hand shows a mildly positive Tinel sign at the wrist    RADIOGRAPHS:  None  Comments: I have personally reviewed the imaging and I agree with the above radiologist's report.    ASSESSMENT/PLAN:     IMPRESSION:  1. Status post de Quervain release right wrist.      2. Possible chronic left carpal tunnel syndrome    PLAN:  For the right wrist routine wound care light activities Voltaren gel topical   Wean out of the wrist brace as tolerated   For the left wrist I have ordered a nerve conduction study to check for carpal tunnel syndrome       FOLLOW UP:  After the nerve test is complete    Disclaimer: This note has been generated using voice-recognition software. There may be typographical errors that have been missed during proof-reading.     "

## (undated) DEVICE — SUT MCRYL PLUS 4-0 PS2 27IN

## (undated) DEVICE — APPLICATOR CHLORAPREP ORN 26ML

## (undated) DEVICE — SPONGE COTTON TRAY 4X4IN

## (undated) DEVICE — TOURNIQUET SB QC DP 18X4IN

## (undated) DEVICE — STOCKINET 4INX48

## (undated) DEVICE — ELECTRODE REM PLYHSV RETURN 9

## (undated) DEVICE — DRESSING XEROFORM NONADH 1X8IN

## (undated) DEVICE — BANDAGE MATRIX HK LOOP 2IN 5YD

## (undated) DEVICE — DRAPE HAND STERILE

## (undated) DEVICE — GLOVE SENSICARE PI MICRO 7.5

## (undated) DEVICE — PACK SURGERY START

## (undated) DEVICE — BANDAGE ESMARK ELASTIC ST 4X9

## (undated) DEVICE — ALCOHOL 70% ISOP W/GREEN 16OZ

## (undated) DEVICE — PAD PREP CUFFED NS 24X48IN

## (undated) DEVICE — TOWEL OR XRAY BLUE 17X26IN

## (undated) DEVICE — GOWN POLY REINF BRTH SLV XL

## (undated) DEVICE — GOWN POLY REINF BRTH SLV LG

## (undated) DEVICE — NDL HYPO REG 25G X 1 1/2

## (undated) DEVICE — BLADE SCALP OPHTL BEVEL STR

## (undated) DEVICE — MANIFOLD 4 PORT

## (undated) DEVICE — SOL IRR 0.9% NACL 500ML PB

## (undated) DEVICE — SYR 10CC LUER LOCK

## (undated) DEVICE — PAD ALOCOHOL PREP MD STRL 2PLY

## (undated) DEVICE — DRAPE THREE-QTR REINF 53X77IN

## (undated) DEVICE — PACK ECLIPSE BASIC III SURG

## (undated) DEVICE — NDL HYPO STD REG BVL 18GX1.5IN

## (undated) DEVICE — COVER OVERHEAD SURG LT BLUE

## (undated) DEVICE — BLADE SURG #15 CARBON STEEL